# Patient Record
Sex: MALE | Race: WHITE
[De-identification: names, ages, dates, MRNs, and addresses within clinical notes are randomized per-mention and may not be internally consistent; named-entity substitution may affect disease eponyms.]

---

## 2017-03-20 ENCOUNTER — HOSPITAL ENCOUNTER (EMERGENCY)
Dept: HOSPITAL 8 - ED | Age: 27
Discharge: HOME | End: 2017-03-20
Payer: MEDICAID

## 2017-03-20 VITALS — WEIGHT: 152.78 LBS | BODY MASS INDEX: 23.98 KG/M2 | HEIGHT: 67 IN

## 2017-03-20 VITALS — DIASTOLIC BLOOD PRESSURE: 86 MMHG | SYSTOLIC BLOOD PRESSURE: 132 MMHG

## 2017-03-20 DIAGNOSIS — B96.89: ICD-10-CM

## 2017-03-20 DIAGNOSIS — J20.9: Primary | ICD-10-CM

## 2017-03-20 PROCEDURE — 99284 EMERGENCY DEPT VISIT MOD MDM: CPT

## 2017-03-20 PROCEDURE — 71020: CPT

## 2017-04-01 ENCOUNTER — HOSPITAL ENCOUNTER (EMERGENCY)
Dept: HOSPITAL 8 - ED | Age: 27
Discharge: HOME | End: 2017-04-01
Payer: MEDICAID

## 2017-04-01 VITALS — WEIGHT: 151.9 LBS | BODY MASS INDEX: 23.84 KG/M2 | HEIGHT: 67 IN

## 2017-04-01 VITALS — SYSTOLIC BLOOD PRESSURE: 130 MMHG | DIASTOLIC BLOOD PRESSURE: 84 MMHG

## 2017-04-01 DIAGNOSIS — J20.9: Primary | ICD-10-CM

## 2017-04-01 DIAGNOSIS — B96.89: ICD-10-CM

## 2017-04-01 PROCEDURE — 87400 INFLUENZA A/B EACH AG IA: CPT

## 2017-04-01 PROCEDURE — 87880 STREP A ASSAY W/OPTIC: CPT

## 2017-04-01 PROCEDURE — 99285 EMERGENCY DEPT VISIT HI MDM: CPT

## 2017-04-01 PROCEDURE — 87081 CULTURE SCREEN ONLY: CPT

## 2017-04-01 PROCEDURE — 71020: CPT

## 2019-04-10 ENCOUNTER — HOSPITAL ENCOUNTER (EMERGENCY)
Dept: HOSPITAL 8 - ED | Age: 29
Discharge: HOME | End: 2019-04-10
Payer: MEDICAID

## 2019-04-10 VITALS — BODY MASS INDEX: 23.14 KG/M2 | HEIGHT: 66 IN | WEIGHT: 143.96 LBS

## 2019-04-10 VITALS — DIASTOLIC BLOOD PRESSURE: 87 MMHG | SYSTOLIC BLOOD PRESSURE: 132 MMHG

## 2019-04-10 DIAGNOSIS — H53.131: Primary | ICD-10-CM

## 2019-04-10 PROCEDURE — 99282 EMERGENCY DEPT VISIT SF MDM: CPT

## 2019-04-15 ENCOUNTER — OFFICE VISIT (OUTPATIENT)
Dept: VASCULAR LAB | Facility: MEDICAL CENTER | Age: 29
End: 2019-04-15
Attending: INTERNAL MEDICINE
Payer: MEDICAID

## 2019-04-15 VITALS
DIASTOLIC BLOOD PRESSURE: 77 MMHG | SYSTOLIC BLOOD PRESSURE: 122 MMHG | HEIGHT: 67 IN | HEART RATE: 78 BPM | BODY MASS INDEX: 22.6 KG/M2 | WEIGHT: 144 LBS

## 2019-04-15 DIAGNOSIS — H34.8110 CENTRAL RETINAL VEIN OCCLUSION WITH MACULAR EDEMA OF RIGHT EYE: ICD-10-CM

## 2019-04-15 PROCEDURE — 99211 OFF/OP EST MAY X REQ PHY/QHP: CPT

## 2019-04-15 PROCEDURE — 99203 OFFICE O/P NEW LOW 30 MIN: CPT | Performed by: INTERNAL MEDICINE

## 2019-04-15 ASSESSMENT — ENCOUNTER SYMPTOMS
NERVOUS/ANXIOUS: 0
SENSORY CHANGE: 0
HEADACHES: 0
DEPRESSION: 0
EYE PAIN: 0
LOSS OF CONSCIOUSNESS: 0
BLURRED VISION: 1
PALPITATIONS: 0
SPEECH CHANGE: 0
DIZZINESS: 0
SHORTNESS OF BREATH: 0
GASTROINTESTINAL NEGATIVE: 1
DOUBLE VISION: 0
COUGH: 0
WEIGHT LOSS: 0

## 2019-04-15 NOTE — PROGRESS NOTES
INITIAL VASCULAR VISIT  Subjective:   Rob Castillo is a 28 y.o. male who presents today 4/15/2019 for   Chief Complaint   Patient presents with   • Follow-Up     HPI:  Patient here for establishment of care to rule out carotid vascular risk factors as a cause of central retinal vein occlusion.  Patient presented to [facility] on April 10 after having 5 days of painless visual loss in his right eye.  He described this as moderate.  He does wear glasses.  He was diagnosed with central retinal vein occlusion.  He was sent to a retinal specialist who confirmed the diagnosis.  He has been started on Avastin.  He is not currently on antiplatelet or anticoagulant therapy.  He has never had a clotting disorder in the past.  His blood pressure was not high presentation, but he has been monitoring his blood pressure since that time.  All of his readings have been less than 130/80 both in the office and at home.  He has no history of diabetes.  He has been doing fingersticks at home and all of his fasting fingersticks have been less than 100.  No bleeding history.  He was given a prescription for hypercoagulable workup by his retina specialist, but he is not done that yet.  No headache or other neurologic symptoms.  No known history of any other eye problems besides myopia    Past medical history -myopia    Past surgical history -none    Family history -does have family history of hypertension and diabetes but no stroke    Social history -no smoking.  Vigorously denies any stimulants.  No alcohol.  No illicit drug use.  Eats relatively healthy.  Exercises regularly.  He is a student    Medications-Avastin    Allergies no known drug allergies     Review of Systems   Constitutional: Negative for malaise/fatigue and weight loss.   HENT: Negative for hearing loss and tinnitus.    Eyes: Positive for blurred vision. Negative for double vision and pain.   Respiratory: Negative for cough and shortness of breath.   "  Cardiovascular: Negative for chest pain, palpitations and leg swelling.   Gastrointestinal: Negative.    Genitourinary: Negative.    Skin: Negative for itching and rash.   Neurological: Negative for dizziness, sensory change, speech change, loss of consciousness and headaches.   Psychiatric/Behavioral: Negative for depression. The patient is not nervous/anxious.       Objective:     Vitals:    04/15/19 1053   BP: 122/77   BP Location: Left arm   Patient Position: Sitting   BP Cuff Size: Small adult   Pulse: 78   Weight: 65.3 kg (144 lb)   Height: 1.702 m (5' 7\")      Body mass index is 22.55 kg/m².  Physical Exam   Constitutional: He is oriented to person, place, and time. He appears well-developed and well-nourished. No distress.   HENT:   Head: Normocephalic and atraumatic.   Eyes: Pupils are equal, round, and reactive to light. Conjunctivae and EOM are normal.   Neck: Normal range of motion. Neck supple. No thyromegaly present.   Cardiovascular: Normal rate, regular rhythm, normal heart sounds and intact distal pulses.  Exam reveals no friction rub.    No murmur heard.  Pulmonary/Chest: Effort normal and breath sounds normal. No respiratory distress. He has no wheezes. He has no rales.   Abdominal: He exhibits no distension. There is no tenderness. There is no rebound.   Musculoskeletal: Normal range of motion. He exhibits no edema.   Neurological: He is alert and oriented to person, place, and time. No cranial nerve deficit. Coordination normal.   Skin: Skin is warm and dry. He is not diaphoretic.   Psychiatric: He has a normal mood and affect. His behavior is normal.              Data review -records from [facility] emergency room and Dr. Bradley office reviewed          Medical Decision Making:  Today's Assessment / Status / Plan:     1. Central retinal vein occlusion with macular edema of right eye  CBC WITHOUT DIFFERENTIAL    TSH    Comp Metabolic Panel    HEMOGLOBIN A1C    URINALYSIS "     ASSESSMENT  -Central retinal vein occlusion without evidence of high blood pressure or diabetes.  Currently undergoing workup for potential hypercoagulable states.  Under treatment of ophthalmology    PLAN:  -Agree with hypercoagulable workup as ordered by ophthalmology  -Add CBC, TSH, CMP, A1c, urinalysis  -We will defer further management to his retinal specialist  -Can consider addition of aspirin in the future depending upon the results of this workup.  Return to avoid aspirin in the acute setting to avoid intraocular bleeding  -Continue to follow blood pressures at home and let me know if they elevate  -Needs to establish a PCP    We will partner with other providers in the management of established vascular disease and cardiometabolic risk factors.    Studies to Be Obtained: Per ophthalmology  Labs to Be Obtained: As above    Follow up in: 2 months    Michael J Bloch, M.D.     Dr. CHICO Bradley

## 2019-04-18 ENCOUNTER — TELEPHONE (OUTPATIENT)
Dept: VASCULAR LAB | Facility: MEDICAL CENTER | Age: 29
End: 2019-04-18

## 2019-04-18 DIAGNOSIS — D75.1 POLYCYTHEMIA: ICD-10-CM

## 2019-04-18 DIAGNOSIS — H34.8190 CENTRAL RETINAL VEIN OCCLUSION WITH MACULAR EDEMA, UNSPECIFIED LATERALITY: ICD-10-CM

## 2019-04-19 NOTE — TELEPHONE ENCOUNTER
Blood work from [facility] reviewed    Still awaiting results of hypercoagulability and hyper viscosity workup ordered by Dr. Bradley    Review of blood work I ordered elevated hemoglobin at 18.2 with modestly elevated albumin and protein    Situation discussed with patient and his mother on the phone.  Will refer to hematology for further evaluation    Await results of hyperviscosity workup    Michael Bloch, MD  Vascular Medicine    CC: James Bradley

## 2019-04-25 ENCOUNTER — TELEPHONE (OUTPATIENT)
Dept: HEMATOLOGY ONCOLOGY | Facility: MEDICAL CENTER | Age: 29
End: 2019-04-25

## 2019-04-25 NOTE — TELEPHONE ENCOUNTER
1st attempt to contact the patient.  LM on voicemail for patient requesting a call back to schedule a new patient hematology appointment.  NP/Adrianne/Kathy/Michael Bloch-[facility] WITH [facility]

## 2019-04-26 ENCOUNTER — OFFICE VISIT (OUTPATIENT)
Dept: HEMATOLOGY ONCOLOGY | Facility: MEDICAL CENTER | Age: 29
End: 2019-04-26
Payer: MEDICAID

## 2019-04-26 VITALS
HEIGHT: 67 IN | DIASTOLIC BLOOD PRESSURE: 80 MMHG | WEIGHT: 145.28 LBS | TEMPERATURE: 97.9 F | BODY MASS INDEX: 22.8 KG/M2 | SYSTOLIC BLOOD PRESSURE: 110 MMHG | OXYGEN SATURATION: 100 % | RESPIRATION RATE: 16 BRPM | HEART RATE: 76 BPM

## 2019-04-26 DIAGNOSIS — H34.8110 CENTRAL RETINAL VEIN OCCLUSION WITH MACULAR EDEMA OF RIGHT EYE: ICD-10-CM

## 2019-04-26 PROCEDURE — 99203 OFFICE O/P NEW LOW 30 MIN: CPT | Performed by: INTERNAL MEDICINE

## 2019-04-26 ASSESSMENT — PAIN SCALES - GENERAL: PAINLEVEL: NO PAIN

## 2019-04-26 NOTE — PROGRESS NOTES
"Consult Note: Hematology    Date of consultation: 4/26/2019 9:00AM    Referring provider: Bloch, Michael J, M.D.    Reason for consultation: Central retinal vein occlusion with macular edema, possibly secondary to hyperviscosity syndrome.       History of presenting illness:   Diana Castillo is a 27 y/o male with significant past medical history of miosis and central retinal vein occlusion with macular edema who presents today for consultation of hyperviscosity syndrome.     Patient states that he has a long history of miosis, which he sees an optometrist for. Patient states that this problem began back in November when he describes some vision problems with the right eye, like a \"floater\". He states at that time there was no pain or symptoms and that he was able to get by with using anti-glare glasses. On April 3rd he felt what he described as a \"blood vessel popping\" in his right eye. He did not think much of it at the time and his only symptom was increased pressure at the right eye. He was eventually seen by      He was seen at [facility] ED on 4/10/19 after his initial eye symptoms did not resolve. Santos-Pen did not suggest intraocular hypertension. Bedside ultrasound was performed as well and did not show evidence of lens dislocation or retinal detachment.      He was then seen by the optometrist Dr. Bradley same day, 4/10/19, per ED note. An ONH and RNFL OU analysis was completed and a ganglion cell OU analysis as well. Please see scanned documents. At that time he was given the current diagnosis is central retinal vein occlusion with macular edema.     He was then seen again on 4/11/19 at [facility] and had a hyperviscosity evaluation completed as well as multiple labs ordered.  At that time the diagnosis was confirmed and he received an injection of Avastin. He states that he tolerated the injection well, but has not noticed much improvement in his vision. He states that it has not " gotten worse, however.      Patient states that he has been feeling well overall. He states that he still has vision problems in the right eye. He describes it as having a distorted visual field and it is difficult to make out complete picture in his right eye. He states that the vision in his left eye is well and he does not have any active complaints there. He is currently wearing sunglasses in the office and states that he does this to help offer additional protection to his eyes and that it helps with his overall vision. He states that he has not experienced any other symptoms or complaints since the initial onset of this problem.         Medications:    No current outpatient prescriptions on file.    No current facility-administered medications for this visit.       Social History:     Social History    Social History  • Marital status:  Single  Spouse name:  N/A  • Number of children:  N/A  • Years of education:  N/A    Occupational History  • Not on file.    Social History Main Topics  • Smoking status:  Not on file  • Smokeless tobacco:  Not on file  • Alcohol use  Not on file  • Drug use:  Unknown  • Sexual activity:  Not on file    Other Topics Concern  • Not on file    Social History Narrative  • No narrative on file      Family History:   No family history on file.    Review of Systems:  All other review of systems are negative except what was mentioned above in the HPI.    Constitutional: No fever, chills, weight loss ,malaise/fatigue.    HEENT: Floaters in R eye field of vision. No pain in b/l eyes. No new auditory. No sore throat and neck pain.     Respiratory:No new cough, sputum production, shortness of breath and wheezing.    Cardiovascular: No new chest pain, palpitations, orthopnea and leg swelling.    Gastrointestinal: No heartburn, nausea, vomiting ,abdominal pain, hematochezia or melena     Genitourinary: Negative for dysuria, hematuria    Musculoskeletal: No new arthralgias or myalgias  "  Skin: Negative for rash and itching.    Neurological: Negative for focal weakness or headaches.    Endo/Heme/Allergies: No abnormal bleed/bruise.    Psychiatric/Behavioral: No new depression/anxiety.    Physical Exam:  Vitals:   /80 (BP Location: Right arm, Patient Position: Sitting, BP Cuff Size: Adult)   Pulse 76   Temp 36.6 °C (97.9 °F) (Temporal)   Resp 16   Ht 1.702 m (5' 7\")   Wt 65.9 kg (145 lb 4.5 oz)   SpO2 100%   BMI 22.75 kg/m²     General: Not in acute distress, alert and oriented x 3  HEENT: No pallor, icterus. Oropharynx clear.   Neck: Supple, no palpable masses.  Lymph nodes: No palpable cervical, supraclavicular, axillary or inguinal lymphadenopathy.    CVS: regular rate and rhythm, no rubs or gallops  RESP: Clear to auscultate bilaterally, no wheezing or crackles.   ABD: Soft, non tender, non distended, positive bowel sounds, no palpable organomegaly  EXT: No edema or cyanosis  CNS: Alert and oriented x3, No focal deficits.  Skin- No rash      Labs:   No results for input(s): RBC, HEMOGLOBIN, HEMATOCRIT, PLATELETCT, PROTHROMBTM, APTT, INR, IRON, FERRITIN, TOTIRONBC in the last 72 hours.  No results found for: SODIUM, POTASSIUM, CHLORIDE, CO2, GLUCOSE, BUN, CREATININE, BUNCREATRAT, GLOMRATE                       Assessment and Plan:  1. Central retinal vein occlusion with macular edema -  - status post intra-ocular administration of Avastin with stable symptoms.  Extensive hypercoagulable work-up was negative for factor V Leiden mutation, prothrombin gene mutation, protein C S deficiency and antiphospholipid antibody syndrome.  He did have slight elevation in his homocysteine level of unclear significance.  There is some association between homocystine levels and CRVO.  Instructed him to take vitamin B12 and folic acid will check MTFR mutation even though this may not change the treatment plan.    He does have some elevation in his hemoglobin hematocrit with no other WBC or platelet " abnormalities.  Unlikely to have primary polycythemia however given his unusual presentation with check Santiago 2 mutation.  We will also check ANCA, RA factors to rule out any other possibility of vasculitis.  PRATIMA testing was negative.    Informed him that the role for systemic anticoagulation is controversial with no clear-cut data for benefit with aspirin or other anticoagulants .  Informed him to discuss with his ophthalmologist about any contraindication for aspirin, if not, I will be reasonable to try baby aspirin long-term .    Return to clinic as needed.    He agreed and verbalized his agreement and understanding with the current plan.  I answered all questions and concerns he has at this time.              Thank you for allowing me to participate in his care.    Please note that this dictation was created using voice recognition software. I have made every reasonable attempt to correct obvious errors, but I expect that there are errors of grammar and possibly content that I did not discover before finalizing the note.      SIGNATURES:      CC:  Pcp Pt States None  Bloch, Michael J, M.D.

## 2019-05-08 DIAGNOSIS — H34.8192 RETINAL VEIN OCCLUSION, UNSPECIFIED LATERALITY, UNSPECIFIED RETINAL VEIN: ICD-10-CM

## 2019-06-13 ENCOUNTER — APPOINTMENT (OUTPATIENT)
Dept: VASCULAR LAB | Facility: MEDICAL CENTER | Age: 29
End: 2019-06-13
Attending: INTERNAL MEDICINE
Payer: MEDICAID

## 2019-07-11 ENCOUNTER — OFFICE VISIT (OUTPATIENT)
Dept: VASCULAR LAB | Facility: MEDICAL CENTER | Age: 29
End: 2019-07-11
Attending: INTERNAL MEDICINE
Payer: MEDICAID

## 2019-07-11 VITALS
DIASTOLIC BLOOD PRESSURE: 96 MMHG | SYSTOLIC BLOOD PRESSURE: 139 MMHG | WEIGHT: 147.2 LBS | HEIGHT: 67 IN | HEART RATE: 61 BPM | BODY MASS INDEX: 23.1 KG/M2

## 2019-07-11 DIAGNOSIS — H34.8190 CENTRAL RETINAL VEIN OCCLUSION WITH MACULAR EDEMA, UNSPECIFIED LATERALITY: ICD-10-CM

## 2019-07-11 DIAGNOSIS — R03.0 WHITE COAT SYNDROME WITHOUT DIAGNOSIS OF HYPERTENSION: ICD-10-CM

## 2019-07-11 PROCEDURE — 99212 OFFICE O/P EST SF 10 MIN: CPT

## 2019-07-11 PROCEDURE — 99213 OFFICE O/P EST LOW 20 MIN: CPT | Performed by: INTERNAL MEDICINE

## 2019-07-11 RX ORDER — TIMOLOL MALEATE 5 MG/ML
1 SOLUTION/ DROPS OPHTHALMIC 2 TIMES DAILY
COMMUNITY
End: 2019-10-17

## 2019-07-11 ASSESSMENT — ENCOUNTER SYMPTOMS
BLURRED VISION: 1
COUGH: 0
HEADACHES: 0
SHORTNESS OF BREATH: 0
EYE PAIN: 0
SENSORY CHANGE: 0
LOSS OF CONSCIOUSNESS: 0
SPEECH CHANGE: 0
WEIGHT LOSS: 0
PALPITATIONS: 0
DOUBLE VISION: 0

## 2019-07-11 NOTE — PROGRESS NOTES
"  Follow Up VASCULAR VISIT  Subjective:   Rob Castillo is a 28 y.o. male who presents today 7-11-19   Chief Complaint   Patient presents with   • Follow-Up     HPI:  Here for f/u of central retinal vein occlusion  Vision improving   Sees optho regularly.  Still with some 'bruising' in retina  Saw heme who did not recommend starting anticoag or antiplatelet, but did recommend b12 and folate for high homocysteine levels, which patient has started  Previous home bp monitoring all <130/80  Has had high readings in office though.     Social history -no smoking.  Vigorously denies any stimulants.  No alcohol.  No illicit drug use.  Eats relatively healthy.  Exercises regularly.  He is a student    Review of Systems   Constitutional: Negative for malaise/fatigue and weight loss.   Eyes: Positive for blurred vision. Negative for double vision and pain.   Respiratory: Negative for cough and shortness of breath.    Cardiovascular: Negative for chest pain, palpitations and leg swelling.   Neurological: Negative for sensory change, speech change, loss of consciousness and headaches.      Objective:     Vitals:    07/11/19 1123 07/11/19 1127   BP: 127/88 139/96   BP Location: Left arm Left arm   Patient Position: Sitting Sitting   BP Cuff Size: Small adult Small adult   Pulse: (!) 59 61   Weight: 66.8 kg (147 lb 3.2 oz)    Height: 1.7 m (5' 6.93\")       Body mass index is 23.1 kg/m².  Physical Exam   Constitutional: He is oriented to person, place, and time. No distress.   Eyes: Pupils are equal, round, and reactive to light. Conjunctivae and EOM are normal.   Cardiovascular: Normal rate, regular rhythm and normal heart sounds.  Exam reveals no friction rub.    No murmur heard.  Pulmonary/Chest: Effort normal and breath sounds normal. No respiratory distress. He has no wheezes. He has no rales.   Abdominal: There is no rebound.   Musculoskeletal: He exhibits no edema or tenderness.   Neurological: He is alert and " oriented to person, place, and time. No cranial nerve deficit. Coordination normal.   Skin: He is not diaphoretic.   Psychiatric: He has a normal mood and affect. His behavior is normal.              Data review - multiple blood work results reviewed from [facility].  Scanned under media          Medical Decision Making:  Today's Assessment / Status / Plan:     1. Central retinal vein occlusion with macular edema, unspecified laterality  CBC WITHOUT DIFFERENTIAL   2. White coat syndrome without diagnosis of hypertension  REFERRAL TO 24-HOUR BLOOD PRESSURE MONITORING     ASSESSMENT  -Central retinal vein occlusion  - No evidence of connective tissue issues or hypercoaguable state  - high homocysteine levels of unclear significance  - BP elevated in office, but low at home c/w white coat htn    PLAN:  - defer any further hypercoag w/u, if needed, to heme  - repeat cbc  - agree with b12/folte  - check 24 hour ABPM  -We will defer further management to his retinal specialist  -Can consider addition of aspirin in the future Woud tend to avoid aspirin in this sub-acute setting to avoid intraocular bleeding  -Continue to follow blood pressures at home and let me know if they elevate  -Needs to establish a PCP    We will partner with other providers in the management of established vascular disease and cardiometabolic risk factors.    Studies to Be Obtained: ABPM  Labs to Be Obtained: cbc    Follow up in: 2 months    Michael J Bloch, M.D.     Dr. CHICO Bradley

## 2019-07-16 ENCOUNTER — TELEPHONE (OUTPATIENT)
Dept: VASCULAR LAB | Facility: MEDICAL CENTER | Age: 29
End: 2019-07-16

## 2019-07-16 NOTE — TELEPHONE ENCOUNTER
Called patient to schedule ABPM appointment. LM instructing patient to call back when available to schedule.    Say Dueñas, Med. Ass't  [facility]

## 2019-08-07 ENCOUNTER — TELEPHONE (OUTPATIENT)
Dept: VASCULAR LAB | Facility: MEDICAL CENTER | Age: 29
End: 2019-08-07

## 2019-08-07 NOTE — TELEPHONE ENCOUNTER
Called patient to schedule ABPM appointment. LM instructing patient to call back when available to schedule.    Say Dueñas, Med. Ass't  [facility]

## 2019-09-04 ENCOUNTER — TELEPHONE (OUTPATIENT)
Dept: VASCULAR LAB | Facility: MEDICAL CENTER | Age: 29
End: 2019-09-04

## 2019-09-04 NOTE — TELEPHONE ENCOUNTER
Called patient to schedule ABPM appointment. LM instructing patient to call back when available to schedule.    Say Dueñas, Med. Ass't  [facility]

## 2019-09-26 ENCOUNTER — APPOINTMENT (OUTPATIENT)
Dept: VASCULAR LAB | Facility: MEDICAL CENTER | Age: 29
End: 2019-09-26
Payer: MEDICAID

## 2019-10-17 ENCOUNTER — OFFICE VISIT (OUTPATIENT)
Dept: VASCULAR LAB | Facility: MEDICAL CENTER | Age: 29
End: 2019-10-17
Attending: INTERNAL MEDICINE
Payer: MEDICAID

## 2019-10-17 VITALS
SYSTOLIC BLOOD PRESSURE: 127 MMHG | WEIGHT: 145.5 LBS | BODY MASS INDEX: 22.84 KG/M2 | HEART RATE: 80 BPM | DIASTOLIC BLOOD PRESSURE: 83 MMHG | HEIGHT: 67 IN

## 2019-10-17 DIAGNOSIS — H34.8192 RETINAL VEIN OCCLUSION, UNSPECIFIED LATERALITY, UNSPECIFIED RETINAL VEIN: ICD-10-CM

## 2019-10-17 PROCEDURE — 99212 OFFICE O/P EST SF 10 MIN: CPT

## 2019-10-17 PROCEDURE — 99213 OFFICE O/P EST LOW 20 MIN: CPT | Performed by: INTERNAL MEDICINE

## 2019-10-17 RX ORDER — TIMOLOL MALEATE 6.8 MG/ML
SOLUTION/ DROPS OPHTHALMIC
Refills: 11 | COMMUNITY
Start: 2019-09-23 | End: 2020-09-28

## 2019-10-17 RX ORDER — ASPIRIN 81 MG/1
81 TABLET, CHEWABLE ORAL ONCE
Status: SHIPPED | OUTPATIENT
Start: 2019-10-17 | End: 2019-10-18

## 2019-10-17 RX ORDER — BRIMONIDINE TARTRATE 2 MG/ML
SOLUTION/ DROPS OPHTHALMIC
Refills: 0 | COMMUNITY
Start: 2019-08-26 | End: 2019-10-17

## 2019-10-17 ASSESSMENT — ENCOUNTER SYMPTOMS
BLURRED VISION: 1
HEADACHES: 0
EYE PAIN: 0
COUGH: 0
DOUBLE VISION: 0
SENSORY CHANGE: 0
SPEECH CHANGE: 0
LOSS OF CONSCIOUSNESS: 0
PALPITATIONS: 0
WEIGHT LOSS: 0
SHORTNESS OF BREATH: 0

## 2019-10-17 NOTE — PROGRESS NOTES
"  Follow Up VASCULAR VISIT  Subjective:   Rob Castillo is a 28 y.o. male who presents today 10-17-19   Chief Complaint   Patient presents with   • Follow-Up     HPI:  Here for f/u of central retinal vein occlusion  Vision improving   Sees optho regularly.  Saw heme who did not recommend starting anticoag or antiplatelet, but did recommend b12 and folate for high homocysteine levels, which patient has started  Previous home bp monitoring all <130/80 - hasn't been checking it at home  Has had high readings in office though.   Not currently on blood thinner    Social history -no smoking.  Vigorously denies any stimulants.  No alcohol.  No illicit drug use.  Eats relatively healthy.  Exercises regularly.  He is a student    Review of Systems   Constitutional: Negative for malaise/fatigue and weight loss.   Eyes: Positive for blurred vision. Negative for double vision and pain.   Respiratory: Negative for cough and shortness of breath.    Cardiovascular: Negative for chest pain, palpitations and leg swelling.   Neurological: Negative for sensory change, speech change, loss of consciousness and headaches.      Objective:     Vitals:    10/17/19 1102   BP: 127/83   BP Location: Left arm   Patient Position: Sitting   BP Cuff Size: Adult   Pulse: 80   Weight: 66 kg (145 lb 8 oz)   Height: 1.7 m (5' 6.93\")      Body mass index is 22.84 kg/m².  Physical Exam   Constitutional: He is oriented to person, place, and time. No distress.   Eyes: Pupils are equal, round, and reactive to light. Conjunctivae and EOM are normal.   Cardiovascular: Normal rate, regular rhythm and normal heart sounds. Exam reveals no friction rub.   No murmur heard.  Pulmonary/Chest: Effort normal and breath sounds normal. No respiratory distress. He has no wheezes. He has no rales.   Abdominal: There is no rebound.   Musculoskeletal: He exhibits no edema or tenderness.   Neurological: He is alert and oriented to person, place, and time. No cranial " nerve deficit. Coordination normal.   Skin: He is not diaphoretic.   Psychiatric: He has a normal mood and affect. His behavior is normal.              Data review -     multiple blood work results reviewed from [facility].  Scanned under media    Repeat blood work oct 2019 - hgb 17.5          Medical Decision Making:  Today's Assessment / Status / Plan:     1. Retinal vein occlusion, unspecified laterality, unspecified retinal vein  REFERRAL TO FOLLOW-UP WITH PRIMARY CARE    aspirin (ASA) chewable tab 81 mg     ASSESSMENT  -Central retinal vein occlusion  - No evidence of connective tissue issues or hypercoaguable state  - high homocysteine levels of unclear significance  - mildly elevated hgb without genetic predisposition to PV per hem  - BP elevated in office, but low at home c/w white coat htn    PLAN:  - defer any further hypercoag w/u, if needed, to heme  - agree with b12/folte  - check 24 hour ABPM (ordered again)  - trial of low dose asa if ok with retinal specialists  -Continue to follow blood pressures at home and let me know if they elevate  -Needs to establish a PCP - referral placed  - otherwise will defer further management to his retinal specialist    We will partner with other providers in the management of established vascular disease and cardiometabolic risk factors.    Studies to Be Obtained: ABPM  Labs to Be Obtained: none    Follow up in: 3 months    Michael J Bloch, M.D.     Cc:  Dr. CHICO Mota

## 2019-10-22 ENCOUNTER — NON-PROVIDER VISIT (OUTPATIENT)
Dept: VASCULAR LAB | Facility: MEDICAL CENTER | Age: 29
End: 2019-10-22
Attending: INTERNAL MEDICINE
Payer: MEDICAID

## 2019-10-22 DIAGNOSIS — H34.8110 CENTRAL RETINAL VEIN OCCLUSION WITH MACULAR EDEMA OF RIGHT EYE: ICD-10-CM

## 2019-10-22 PROCEDURE — 93786 AMBL BP MNTR W/SW REC ONLY: CPT

## 2019-10-22 PROCEDURE — 93788 AMBL BP MNTR W/SW A/R: CPT

## 2019-10-22 PROCEDURE — 93790 AMBL BP MNTR W/SW I&R: CPT | Performed by: INTERNAL MEDICINE

## 2019-10-23 ENCOUNTER — TELEPHONE (OUTPATIENT)
Dept: VASCULAR LAB | Facility: MEDICAL CENTER | Age: 29
End: 2019-10-23

## 2019-10-23 NOTE — TELEPHONE ENCOUNTER
Reported results to pt about his average BP being 119/73 and Dr. Bloch will go over further details at next appt.

## 2019-10-23 NOTE — PROGRESS NOTES
Ambulatory blood pressure monitor results reviewed  Full report under media tab  Reasonable data acquisition    Mean daytime: 119/72 indicative of well-controlled out of office blood pressure    Nocturnal dip: Appears blunted    Michael Bloch, MD  Vascular Care

## 2020-01-10 ENCOUNTER — APPOINTMENT (OUTPATIENT)
Dept: VASCULAR LAB | Facility: MEDICAL CENTER | Age: 30
End: 2020-01-10
Payer: MEDICAID

## 2020-01-29 ENCOUNTER — OFFICE VISIT (OUTPATIENT)
Dept: VASCULAR LAB | Facility: MEDICAL CENTER | Age: 30
End: 2020-01-29
Attending: INTERNAL MEDICINE
Payer: MEDICAID

## 2020-01-29 VITALS
WEIGHT: 145.5 LBS | HEART RATE: 88 BPM | DIASTOLIC BLOOD PRESSURE: 77 MMHG | BODY MASS INDEX: 22.84 KG/M2 | HEIGHT: 67 IN | SYSTOLIC BLOOD PRESSURE: 133 MMHG

## 2020-01-29 DIAGNOSIS — H34.8192 RETINAL VEIN OCCLUSION, UNSPECIFIED LATERALITY, UNSPECIFIED RETINAL VEIN: ICD-10-CM

## 2020-01-29 PROCEDURE — 99211 OFF/OP EST MAY X REQ PHY/QHP: CPT

## 2020-01-29 PROCEDURE — 99213 OFFICE O/P EST LOW 20 MIN: CPT | Performed by: INTERNAL MEDICINE

## 2020-01-29 ASSESSMENT — ENCOUNTER SYMPTOMS
WEIGHT LOSS: 0
LOSS OF CONSCIOUSNESS: 0
SHORTNESS OF BREATH: 0
DOUBLE VISION: 0
SENSORY CHANGE: 0
SPEECH CHANGE: 0
HEADACHES: 0
COUGH: 0
EYE PAIN: 0
PALPITATIONS: 0
BLURRED VISION: 1

## 2020-01-29 NOTE — PROGRESS NOTES
"  Follow Up VASCULAR VISIT  Subjective:   Rob Castillo is a 28 y.o. male who presents today 1-29-20  Chief Complaint   Patient presents with   • Follow-Up     HPI:  Here for f/u of central retinal vein occlusion  Vision stable   Sees optho regularly.  No recent f/u with heme  Still on b12 and folate  Did not start asa - did not discuss with optho  No bp meds  Home readings [id number]  Had abpm    Social history -no smoking.  Vigorously denies any stimulants.  No alcohol.  No illicit drug use.  Eats relatively healthy.  Exercises regularly.  He is a student    Review of Systems   Constitutional: Negative for malaise/fatigue and weight loss.   Eyes: Positive for blurred vision. Negative for double vision and pain.   Respiratory: Negative for cough and shortness of breath.    Cardiovascular: Negative for chest pain, palpitations and leg swelling.   Neurological: Negative for sensory change, speech change, loss of consciousness and headaches.      Objective:     Vitals:    01/29/20 1053 01/29/20 1056   BP: 131/69 133/77   BP Location: Left arm Left arm   Patient Position: Sitting Sitting   BP Cuff Size: Small adult Small adult   Pulse: 70 88   Weight: 66 kg (145 lb 8.1 oz)    Height: 1.7 m (5' 6.93\")       Body mass index is 22.84 kg/m².  Physical Exam   Constitutional: He is oriented to person, place, and time. No distress.   Eyes: Pupils are equal, round, and reactive to light. Conjunctivae and EOM are normal.   Cardiovascular: Normal rate, regular rhythm and normal heart sounds. Exam reveals no friction rub.   No murmur heard.  Pulmonary/Chest: Effort normal and breath sounds normal. No respiratory distress. He has no wheezes. He has no rales.   Abdominal: There is no rebound.   Musculoskeletal:         General: No tenderness or edema.   Neurological: He is alert and oriented to person, place, and time. No cranial nerve deficit. Coordination normal.   Skin: He is not diaphoretic.   Psychiatric: He " has a normal mood and affect. His behavior is normal.              Data review -     Repeat blood work oct 2019 - hgb 17.5    AbPM Oct 2019:  Mean daytime: 119/72 indicative of well-controlled out of office blood pressure  Nocturnal dip: Appears blunted          Medical Decision Making:  Today's Assessment / Status / Plan:     1. Retinal vein occlusion, unspecified laterality, unspecified retinal vein  CBC WITHOUT DIFFERENTIAL    Basic Metabolic Panel    HOMOCYSTEINE     ASSESSMENT  -Central retinal vein occlusion  - No evidence of connective tissue issues or hypercoaguable state  - high homocysteine levels of unclear significance  - mildly elevated hgb without genetic predisposition to PV per hem  - BP at home in office and on ABPM c/w prehypertension or mild stage 1 hypertension - given overall risk does not qualify for pharmacological therapy    PLAN:  - defer any further hypercoag w/u, if needed, to heme  - continue with b12/folte  - once again recommended a trial of low dose asa if ok with retinal specialists  -Continue to follow blood pressures at home (about once weekly) and let me know if they elevate  - continue much improved diet and exercise habits  -Needs to establish a PCP - given number to call  - otherwise will defer further management to his retinal specialist    We will partner with other providers in the management of established vascular disease and cardiometabolic risk factors.    Studies to Be Obtained: none  Labs to Be Obtained: as above prior to next visit    Follow up in: 6 months    Michael J Bloch, M.D.     Cc:  Dr. CHICO Rosario

## 2020-06-26 ENCOUNTER — TELEPHONE (OUTPATIENT)
Dept: VASCULAR LAB | Facility: MEDICAL CENTER | Age: 30
End: 2020-06-26

## 2020-07-30 ENCOUNTER — APPOINTMENT (OUTPATIENT)
Dept: VASCULAR LAB | Facility: MEDICAL CENTER | Age: 30
End: 2020-07-30
Payer: MEDICAID

## 2020-09-28 ENCOUNTER — OFFICE VISIT (OUTPATIENT)
Dept: VASCULAR LAB | Facility: MEDICAL CENTER | Age: 30
End: 2020-09-28
Attending: INTERNAL MEDICINE
Payer: MEDICAID

## 2020-09-28 VITALS
WEIGHT: 143.8 LBS | HEART RATE: 99 BPM | SYSTOLIC BLOOD PRESSURE: 129 MMHG | BODY MASS INDEX: 22.57 KG/M2 | HEIGHT: 67 IN | DIASTOLIC BLOOD PRESSURE: 79 MMHG

## 2020-09-28 DIAGNOSIS — H34.8192 RETINAL VEIN OCCLUSION, UNSPECIFIED LATERALITY, UNSPECIFIED RETINAL VEIN: ICD-10-CM

## 2020-09-28 DIAGNOSIS — R05.9 COUGH: ICD-10-CM

## 2020-09-28 DIAGNOSIS — H34.8110 CENTRAL RETINAL VEIN OCCLUSION WITH MACULAR EDEMA OF RIGHT EYE: ICD-10-CM

## 2020-09-28 DIAGNOSIS — R03.0 WHITE COAT SYNDROME WITHOUT DIAGNOSIS OF HYPERTENSION: ICD-10-CM

## 2020-09-28 DIAGNOSIS — D75.1 POLYCYTHEMIA: ICD-10-CM

## 2020-09-28 PROCEDURE — 99211 OFF/OP EST MAY X REQ PHY/QHP: CPT

## 2020-09-28 PROCEDURE — 99213 OFFICE O/P EST LOW 20 MIN: CPT | Performed by: INTERNAL MEDICINE

## 2020-09-28 RX ORDER — LATANOPROST 50 UG/ML
1 SOLUTION/ DROPS OPHTHALMIC NIGHTLY
COMMUNITY

## 2020-09-28 RX ORDER — ASPIRIN 81 MG/1
81 TABLET, CHEWABLE ORAL DAILY
Qty: 100 TAB | Refills: 11 | Status: SHIPPED | OUTPATIENT
Start: 2020-09-28

## 2020-09-28 ASSESSMENT — ENCOUNTER SYMPTOMS
DEPRESSION: 0
BLURRED VISION: 1
NERVOUS/ANXIOUS: 0
SHORTNESS OF BREATH: 0
EYE PAIN: 0
DOUBLE VISION: 0
SENSORY CHANGE: 0
WEIGHT LOSS: 0
HEADACHES: 0
SPEECH CHANGE: 0
COUGH: 1
PALPITATIONS: 0

## 2020-09-28 NOTE — PROGRESS NOTES
"  Follow Up VASCULAR VISIT  Subjective:   Rob Castillo is a 28 y.o. male who presents today 9-28-20  Chief Complaint   Patient presents with   • Follow-Up     HPI:  Here for f/u of central retinal vein occlusion  Vision stable   Sees optho regularly.  No recent f/u with heme  Still on b12 and folate  Now on aspirin  No bp meds  Home readings [id number]  No recent follow-up with him   He has been having a bit of exercise-induced cough.  Thinks he may have asthma.  Has not yet established with a PCP    Social history -no smoking.  Vigorously denies any stimulants.  No alcohol.  No illicit drug use.  Eats relatively healthy.  Exercises regularly.  He is a student    Review of Systems   Constitutional: Negative for malaise/fatigue and weight loss.   Eyes: Positive for blurred vision. Negative for double vision and pain.   Respiratory: Positive for cough. Negative for shortness of breath.    Cardiovascular: Negative for chest pain, palpitations and leg swelling.   Neurological: Negative for sensory change, speech change and headaches.   Psychiatric/Behavioral: Negative for depression. The patient is not nervous/anxious.       Objective:     Vitals:    09/28/20 1549   BP: 129/79   BP Location: Left arm   Patient Position: Sitting   BP Cuff Size: Small adult   Pulse: 99   Weight: 65.2 kg (143 lb 12.8 oz)   Height: 1.7 m (5' 6.93\")      Body mass index is 22.57 kg/m².  Physical Exam   Constitutional: He is oriented to person, place, and time. No distress.   HENT:   Head: Normocephalic and atraumatic.   Eyes: Conjunctivae and EOM are normal. No scleral icterus.   Cardiovascular: Normal rate, regular rhythm and normal heart sounds.   No murmur heard.  Pulmonary/Chest: Effort normal and breath sounds normal. No respiratory distress. He has no wheezes. He has no rales.   Musculoskeletal:         General: No tenderness or edema.   Neurological: He is alert and oriented to person, place, and time. No cranial nerve " deficit. Coordination normal.   Skin: He is not diaphoretic.   Psychiatric: He has a normal mood and affect. His behavior is normal.              Data review -     Repeat blood work oct 2019 - hgb 17.5    AbPM Oct 2019:  Mean daytime: 119/72 indicative of well-controlled out of office blood pressure  Nocturnal dip: Appears blunted    Blood work September 2020-hemoglobin 17.3, glucose 82, homocystine 10, GFR 96          Medical Decision Making:  Today's Assessment / Status / Plan:     1. Retinal vein occlusion, unspecified laterality, unspecified retinal vein  aspirin (ASA) 81 MG Chew Tab chewable tablet   2. White coat syndrome without diagnosis of hypertension     3. Central retinal vein occlusion with macular edema of right eye     4. Polycythemia  REFERRAL TO HEMATOLOGY ONCOLOGY Referral to? [facility]   5. Cough  REFERRAL TO FOLLOW-UP WITH PRIMARY CARE     ASSESSMENT  -Central retinal vein occlusion  - No evidence of connective tissue issues or hypercoaguable state  - high homocysteine levels of unclear significance -improved on B12 and folate  - mildly elevated hgb -persistent -  without genetic predisposition to PV per heme  - BP at home in office and on ABPM c/w prehypertension or mild stage 1 hypertension - given overall risk does not qualify for pharmacological therapy  -Possible exercise-induced bronchospasm    PLAN:  - defer any further hypercoag w/u, if needed, to heme  - continue with b12/folte  -Continue low-dose aspirin  -Continue to follow blood pressures at home (about once weekly) and let me know if they elevate  - continue much improved diet and exercise habits  -Needs to establish a PCP - given number to call and referral sent  -Recommended follow-up with hematology -referral sent    We will partner with other providers in the management of established vascular disease and cardiometabolic risk factors.    Studies to Be Obtained: none  Labs to Be Obtained: None currently    Follow up  in: 6 months    Michael J Bloch, M.D.     Cc:  Dr. CHICO Spear

## 2021-03-29 ENCOUNTER — APPOINTMENT (OUTPATIENT)
Dept: VASCULAR LAB | Facility: MEDICAL CENTER | Age: 31
End: 2021-03-29
Payer: MEDICAID

## 2021-05-20 ENCOUNTER — OFFICE VISIT (OUTPATIENT)
Dept: VASCULAR LAB | Facility: MEDICAL CENTER | Age: 31
End: 2021-05-20
Attending: INTERNAL MEDICINE
Payer: MEDICAID

## 2021-05-20 VITALS
SYSTOLIC BLOOD PRESSURE: 118 MMHG | HEART RATE: 76 BPM | WEIGHT: 144 LBS | DIASTOLIC BLOOD PRESSURE: 72 MMHG | BODY MASS INDEX: 23.14 KG/M2 | HEIGHT: 66 IN

## 2021-05-20 DIAGNOSIS — H34.8192 RETINAL VEIN OCCLUSION, UNSPECIFIED LATERALITY, UNSPECIFIED RETINAL VEIN: ICD-10-CM

## 2021-05-20 PROCEDURE — 99213 OFFICE O/P EST LOW 20 MIN: CPT | Performed by: INTERNAL MEDICINE

## 2021-05-20 PROCEDURE — 99212 OFFICE O/P EST SF 10 MIN: CPT

## 2021-05-20 NOTE — PROGRESS NOTES
"  Follow Up VASCULAR VISIT  Subjective:   Rob Castillo is a 28 y.o. male who presents today 5-20-21  Chief Complaint   Patient presents with   • Follow-Up     HPI:  Here for f/u of central retinal vein occlusion  No further vision changes  Sees optho regularly.  Saw hematology who asked for some more blood work  Still on b12 and folate  Remains on aspirin  No bp meds  Home readings [id number]      Social history -no smoking.  Vigorously denies any stimulants.  No alcohol.  No illicit drug use.  Eats relatively healthy.  Exercises regularly.  He is a student     Objective:     Vitals:    05/20/21 1006   BP: 118/72   BP Location: Left arm   Patient Position: Sitting   BP Cuff Size: Adult   Pulse: 76   Weight: 65.3 kg (144 lb)   Height: 1.676 m (5' 6\")      Body mass index is 23.24 kg/m².  Physical Exam  Constitutional:       General: He is not in acute distress.     Appearance: He is not diaphoretic.   HENT:      Head: Normocephalic and atraumatic.   Eyes:      General: No scleral icterus.     Conjunctiva/sclera: Conjunctivae normal.   Cardiovascular:      Rate and Rhythm: Normal rate and regular rhythm.      Heart sounds: Normal heart sounds. No murmur heard.     Pulmonary:      Effort: Pulmonary effort is normal. No respiratory distress.      Breath sounds: Normal breath sounds. No wheezing or rales.   Musculoskeletal:         General: No tenderness.   Neurological:      Mental Status: He is alert and oriented to person, place, and time.      Cranial Nerves: No cranial nerve deficit.      Coordination: Coordination normal.   Psychiatric:         Behavior: Behavior normal.                Data review -     Repeat blood work oct 2019 - hgb 17.5    AbPM Oct 2019:  Mean daytime: 119/72 indicative of well-controlled out of office blood pressure  Nocturnal dip: Appears blunted    Blood work September 2020-hemoglobin 17.3, glucose 82, homocystine 10, GFR 96          Medical Decision Making:  Today's Assessment / " Status / Plan:     1. Retinal vein occlusion, unspecified laterality, unspecified retinal vein       ASSESSMENT  - Central retinal vein occlusion  - No evidence of connective tissue issues or hypercoaguable state  - high homocysteine levels of unclear significance -improved on B12 and folate  - mildly elevated hgb -persistent -  without genetic predisposition to PV per heme  - BP at home in office and on ABPM c/w prehypertension or mild stage 1 hypertension in the past.  Now appears normotensive with improvement in diet and exercise habits  -Possible exercise-induced bronchospasm    PLAN:  -Obtain Santiago mutation evaluation as heme ordered  -We will also obtain a repeat CBC, B12, folate, homocystine, and MTHFR mutation analysis as requested in hematology note  - continue with b12/folte  -Continue low-dose aspirin  -Continue to follow blood pressures at home (about once weekly) and let me know if they elevate  - continue much improved diet and exercise habits  -Follow-up regularly with ophthalmology  -Report any vision changes to ophthalmology immediately    We will partner with other providers in the management of established vascular disease and cardiometabolic risk factors.    Studies to Be Obtained: none  Labs to Be Obtained: None currently    Follow up in: 3 months to review blood work    Michael J Bloch, M.D.     Cc:  Dr. CHICO Lovelace

## 2021-08-25 ENCOUNTER — OFFICE VISIT (OUTPATIENT)
Dept: VASCULAR LAB | Facility: MEDICAL CENTER | Age: 31
End: 2021-08-25
Attending: INTERNAL MEDICINE
Payer: MEDICAID

## 2021-08-25 DIAGNOSIS — D75.1 POLYCYTHEMIA: ICD-10-CM

## 2021-08-25 DIAGNOSIS — H34.8110 CENTRAL RETINAL VEIN OCCLUSION WITH MACULAR EDEMA OF RIGHT EYE: ICD-10-CM

## 2021-08-25 PROCEDURE — 99213 OFFICE O/P EST LOW 20 MIN: CPT | Performed by: INTERNAL MEDICINE

## 2021-08-25 NOTE — PROGRESS NOTES
Follow Up VASCULAR VISIT  Subjective:   Rob Castillo is a 31 y.o. male who presents today 8-26-21 for vascular followup     This visit was conducted via Zoom video call using secure and encrypted video conferencing technology due to covid restrictions.   The patient was in a private location in the state [facility]   The patient's identity was confirmed and verbal consent was obtained for this virtual visit.    HPI:  Here for f/u of central retinal vein occlusion  No further vision changes  Sees optho regularly.  Hasn't had blood work done - having trouble getting the order for reina mutation that was requested by [facility]  Still on b12 and folate  Remains on aspirin  No bp meds  Home readings [id number]    Social history -no smoking.  Vigorously denies any stimulants.  No alcohol.  No illicit drug use.  Eats relatively healthy.  Exercises regularly.  He is a student     Objective:     There were no vitals filed for this visit.   There is no height or weight on file to calculate BMI.  Physical Exam  Constitutional:       General: He is not in acute distress.     Appearance: He is not diaphoretic.   HENT:      Head: Normocephalic and atraumatic.   Eyes:      General: No scleral icterus.     Conjunctiva/sclera: Conjunctivae normal.   Pulmonary:      Effort: Pulmonary effort is normal. No respiratory distress.   Musculoskeletal:         General: No tenderness.   Skin:     Coloration: Skin is not pale.   Neurological:      General: No focal deficit present.      Mental Status: He is alert and oriented to person, place, and time.      Cranial Nerves: No cranial nerve deficit.      Coordination: Coordination normal.   Psychiatric:         Mood and Affect: Mood normal.         Behavior: Behavior normal.                Data review -     Repeat blood work oct 2019 - hgb 17.5    AbPM Oct 2019:  Mean daytime: 119/72 indicative of well-controlled out of office blood pressure  Nocturnal dip: Appears blunted    Blood work  September 2020-hemoglobin 17.3, glucose 82, homocystine 10, GFR 96          Medical Decision Making:  Today's Assessment / Status / Plan:     1. Polycythemia  JAK2 GENE MUT RFLX CALR RFLX MPL   2. Central retinal vein occlusion with macular edema of right eye       ASSESSMENT  - Central retinal vein occlusion, stable  - No evidence of connective tissue issues or hypercoaguable state  - high homocysteine levels of unclear significance -improved on B12 and folate  - mildly elevated hgb -persistent -  without genetic predisposition to PV per heme  - BP at home in office and on ABPM c/w prehypertension or mild stage 1 hypertension in the past.  Now appears normotensive with improvement in diet and exercise habits    PLAN:  -Obtain Santiago mutation evaluation as heme ordered - new lab slip generated  -still awaiting repeat CBC, B12, folate, homocystine, and MTHFR mutation analysis   - continue with b12/folte  -Continue low-dose aspirin  -Continue to follow blood pressures at home (about once weekly) and let me know if they elevate  - continue much improved diet and exercise habits  -Follow-up regularly with ophthalmology  -Report any vision changes to ophthalmology immediately    We will partner with other providers in the management of established vascular disease and cardiometabolic risk factors.    Studies to Be Obtained: none  Labs to Be Obtained: SANTIAGO mutation, mthfr dna analysis, homocysteine, folate, b12, cbc    Follow up in: 2 months to review blood work and bp - virtual visit by patient request    Michael J Bloch, M.D.     Cc:  Dr. CHICO Lovelace

## 2021-10-20 ENCOUNTER — OFFICE VISIT (OUTPATIENT)
Dept: VASCULAR LAB | Facility: MEDICAL CENTER | Age: 31
End: 2021-10-20
Attending: INTERNAL MEDICINE
Payer: MEDICAID

## 2021-10-20 ENCOUNTER — TELEPHONE (OUTPATIENT)
Dept: VASCULAR LAB | Facility: MEDICAL CENTER | Age: 31
End: 2021-10-20

## 2021-10-20 DIAGNOSIS — H34.8110 CENTRAL RETINAL VEIN OCCLUSION WITH MACULAR EDEMA OF RIGHT EYE: ICD-10-CM

## 2021-10-20 DIAGNOSIS — D75.1 POLYCYTHEMIA: ICD-10-CM

## 2021-10-20 PROCEDURE — 99213 OFFICE O/P EST LOW 20 MIN: CPT | Performed by: INTERNAL MEDICINE

## 2021-10-20 NOTE — PROGRESS NOTES
Follow Up VASCULAR VISIT  Subjective:   Rob Castillo is a 31 y.o. male who presents today 10-20-21 for vascular followup     This visit was conducted via Zoom video call using secure and encrypted video conferencing technology due to covid restrictions.   The patient was in a private location in the state [facility]   The patient's identity was confirmed and verbal consent was obtained for this virtual visit.    HPI:  Here for f/u of central retinal vein occlusion  No further vision changes  Sees optho regularly.  Has appointment to see them later this week  Had blood work done yesterday, results pending  Still on b12 and folate  Remains on aspirin  No bp meds  Home readings [id number]    Social history -no smoking.  Vigorously denies any stimulants.  No alcohol.  No illicit drug use.  Eats relatively healthy.  Exercises regularly.  He is a student     Objective:     There were no vitals filed for this visit.   There is no height or weight on file to calculate BMI.  Physical Exam  Constitutional:       General: He is not in acute distress.     Appearance: He is not diaphoretic.   HENT:      Head: Normocephalic and atraumatic.   Eyes:      General: No scleral icterus.     Conjunctiva/sclera: Conjunctivae normal.   Pulmonary:      Effort: Pulmonary effort is normal. No respiratory distress.   Musculoskeletal:         General: No tenderness.   Skin:     Coloration: Skin is not pale.   Neurological:      General: No focal deficit present.      Mental Status: He is alert and oriented to person, place, and time.      Cranial Nerves: No cranial nerve deficit.      Coordination: Coordination normal.   Psychiatric:         Mood and Affect: Mood normal.         Behavior: Behavior normal.                Data review -     Repeat blood work oct 2019 - hgb 17.5    AbPM Oct 2019:  Mean daytime: 119/72 indicative of well-controlled out of office blood pressure  Nocturnal dip: Appears blunted    Blood work September  2020-hemoglobin 17.3, glucose 82, homocystine 10, GFR 96          Medical Decision Making:  Today's Assessment / Status / Plan:     1. Polycythemia     2. Central retinal vein occlusion with macular edema of right eye       ASSESSMENT    - Central retinal vein occlusion, stable  - No evidence of connective tissue issues or hypercoaguable state  - high homocysteine levels of unclear significance -improved on B12 and folate  - mildly elevated hgb -persistent -  without genetic predisposition to PV per heme  - BP at home in office and on ABPM c/w prehypertension or mild stage 1 hypertension in the past.  Now appears normotensive with improvement in diet and exercise habits    PLAN:    -Obtain Santiago mutation evaluation as heme ordered - new lab slip generated -our MA called [facility] today and blood work still pending  -still awaiting repeat CBC, B12, folate, homocystine, and MTHFR mutation analysis -blood work is still pending  - continue with b12/folte  -Continue low-dose aspirin  -Continue to follow blood pressures at home (about once weekly) and let me know if they elevate  - continue much improved diet and exercise habits  -Follow-up regularly with ophthalmology.  -Report any vision changes to ophthalmology immediately  -Patient states that his mom had some questions about getting some genetic testing done.  As I told him I think this is outside the realm of expertise.  I encouraged him to follow-up with his retinal specialist regarding any questions about genetic testing    We will partner with other providers in the management of established vascular disease and cardiometabolic risk factors.    Studies to Be Obtained: none  Labs to Be Obtained: SANTIAGO mutation, mthfr dna analysis, homocysteine, folate, b12, cbc -results pending    Follow up in: By phone once blood tests available.    Michael J Bloch, M.D.     Cc:  Dr. CHICO Lovelace

## 2021-10-21 ENCOUNTER — TELEPHONE (OUTPATIENT)
Dept: VASCULAR LAB | Facility: MEDICAL CENTER | Age: 31
End: 2021-10-21

## 2021-10-21 NOTE — TELEPHONE ENCOUNTER
Called and left VM letting pt know the below information.     ----- Message from TAISHA Agarwal sent at 10/21/2021  7:17 AM PDT -----  Regarding: lab work  Please let the pt know that the only test back from the lab so far is the CBC. It is stable and we will scan it into his chart. When the remainder comes back we can let him know. Thanks TAISHA Agarwal

## 2021-10-21 NOTE — TELEPHONE ENCOUNTER
Called pt and notified him.  ----- Message from Michael J Bloch, M.D. sent at 10/20/2021  4:01 PM PDT -----  Regarding: call when blood work available  Pls call patient and let him know that his blood work is still pending.  We will call him when we have results.  Otherwise he should follow up with his retinal specialist.    lito

## 2023-12-13 ENCOUNTER — OFFICE VISIT (OUTPATIENT)
Dept: VASCULAR LAB | Facility: MEDICAL CENTER | Age: 33
End: 2023-12-13
Attending: INTERNAL MEDICINE
Payer: MEDICAID

## 2023-12-13 VITALS
HEART RATE: 101 BPM | DIASTOLIC BLOOD PRESSURE: 81 MMHG | HEIGHT: 66 IN | SYSTOLIC BLOOD PRESSURE: 127 MMHG | WEIGHT: 158 LBS | BODY MASS INDEX: 25.39 KG/M2

## 2023-12-13 DIAGNOSIS — H34.8110 CENTRAL RETINAL VEIN OCCLUSION WITH MACULAR EDEMA OF RIGHT EYE: ICD-10-CM

## 2023-12-13 PROCEDURE — 99212 OFFICE O/P EST SF 10 MIN: CPT

## 2023-12-13 PROCEDURE — 3074F SYST BP LT 130 MM HG: CPT | Performed by: INTERNAL MEDICINE

## 2023-12-13 PROCEDURE — 3079F DIAST BP 80-89 MM HG: CPT | Performed by: INTERNAL MEDICINE

## 2023-12-13 PROCEDURE — 99215 OFFICE O/P EST HI 40 MIN: CPT | Performed by: INTERNAL MEDICINE

## 2023-12-13 RX ORDER — DORZOLAMIDE HCL 20 MG/ML
SOLUTION/ DROPS OPHTHALMIC
COMMUNITY
Start: 2023-12-06

## 2023-12-14 ENCOUNTER — DOCUMENTATION (OUTPATIENT)
Dept: VASCULAR LAB | Facility: MEDICAL CENTER | Age: 33
End: 2023-12-14
Payer: MEDICAID

## 2023-12-14 NOTE — PROGRESS NOTES
Called and left message for patient requesting which [facility] lab patient would like get labs done, so we can fax accordingly.    Faxed request for most recent visit notes from [facility] for Dr Bloch.  (Fx#[phone])    Fx confirmation in media.    Rubi Collier, Med Ass't  [facility]  Ph. [phone]  Fx. [phone]

## 2023-12-14 NOTE — PROGRESS NOTES
"  Follow Up VASCULAR VISIT  Subjective:   Rob Castillo is a 31 y.o. male who presents 12/13/23   for vascular followup     HPI:  Here for f/u of central retinal vein occlusion  Apparently about 2 weeks ago patient had worsening vision in his right eye.  He went back to see his ophthalmologist and then to his retinal specialist and according to patient and his mother he was again diagnosed with a recurrent central retinal vein occlusion.  He has been taking drops and getting injections with no real improvement in his vision  He is seeing the retina specialist regularly  He had stopped taking his aspirin and has not restarted  He takes B12 but not folate as previously prescribed  His blood pressure has been high at the ophthalmologist office but he has not been checking it at home  He is on no blood pressure medicines    Social history -no smoking.  Vigorously denies any stimulants.  No alcohol.  No illicit drug use.  Eats relatively healthy.  Exercises regularly.  He is a student     Objective:     Vitals:    12/13/23 1545   BP: 127/81   BP Location: Left arm   Patient Position: Sitting   BP Cuff Size: Adult   Pulse: (!) 101   Weight: 71.7 kg (158 lb)   Height: 1.676 m (5' 6\")      Body mass index is 25.5 kg/m².  Physical Exam  Constitutional:       General: He is not in acute distress.     Appearance: He is not diaphoretic.   HENT:      Head: Normocephalic and atraumatic.   Eyes:      General: No scleral icterus.     Conjunctiva/sclera: Conjunctivae normal.   Pulmonary:      Effort: Pulmonary effort is normal. No respiratory distress.   Musculoskeletal:         General: No tenderness.   Skin:     Coloration: Skin is not pale.   Neurological:      General: No focal deficit present.      Mental Status: He is alert and oriented to person, place, and time.      Cranial Nerves: No cranial nerve deficit.      Coordination: Coordination normal.   Psychiatric:         Mood and Affect: Mood normal.         Behavior: " Behavior normal.                Data review -     Blood work 2019  ANCA negative  Rheumatoid factor negative  Factor V Leiden negative    Repeat blood work oct 2019 - hgb 17.5    AbPM Oct 2019:  Mean daytime: 119/72 indicative of well-controlled out of office blood pressure  Nocturnal dip: Appears blunted    Blood work September 2020-hemoglobin 17.3, glucose 82, homocystine 10, GFR 96    Blood work [facility] October 2021  White count 4.2, hemoglobin 16.8, platelet count 214,  Heterozygous for MTHFR variant  Santiago 2 mutation negative    Blood work November 2023  Antithrombin III normal  Antiphospholipid antibodies unremarkable  Lupus anticoagulant negative  Total cholesterol 203, HDL 62, triglycerides 61,   Homocystine 13.8  A1c 5.0  Lysozyme 6.3  Protein C normal  Protein S normal  ACE enzyme normal  PRATIMA normal          Medical Decision Making:  Today's Assessment / Status / Plan:     1. Central retinal vein occlusion with macular edema of right eye          ASSESSMENT    - Central retinal vein occlusion, recurrent and unexplained  - No evidence of connective tissue issues or hypercoaguable state  - high homocysteine levels of unclear significance -but currently untreated  - mildly elevated hgb -persistent -  without genetic predisposition to PV per heme  - BP at home in office and on ABPM c/w prehypertension or mild stage 1 hypertension in the past.  Mildly elevated in the office today.  Unclear at home    PLAN:    As I discussed with patient and his mother, aside from ruling out hypercoagulable state there is not much of a role for vascular medicine in the management of central retinal vein occlusion    -Restart home blood pressure monitoring  -Consider antihypertensive therapy, likely ARB, if greater than 130/80 at home  -Consider repeat ABPM  -Restart Folgard -prescription sent to his pharmacy  -Restart aspirin  -Obtain prothrombin gene mutation as I cannot find documentation that was normal in the  past  -Follow-up regularly with ophthalmology and retinal specialist  -Urgent referral to neuro-ophthalmology  -Report any vision changes to ophthalmology immediately  -Follow-up with hematology or rheumatology ophthalmology thinks it would be helpful    We will partner with other providers in the management of established vascular disease and cardiometabolic risk factors.    Studies to Be Obtained: none  Labs to Be Obtained: Prothrombin gene mutation    Follow up in: 4 weeks    Total time: 45 min - chart review/prep, review of other providers' records, imaging/lab review, face-to-face time for history/examination, ordering, prescribing,  review of results/meds/ treatment plan with patient/family/caregiver, documentation in EMR, care coordination (as needed)     Michael J Bloch, M.D.     Cc:  Dr. CHICO Lovelace

## 2023-12-18 ENCOUNTER — TELEPHONE (OUTPATIENT)
Dept: VASCULAR LAB | Facility: MEDICAL CENTER | Age: 33
End: 2023-12-18
Payer: MEDICAID

## 2023-12-18 NOTE — TELEPHONE ENCOUNTER
Caller: Rob Castillo     Topic/issue: Pt called back in regards to rescheduling appt with Bloch, Pt would like his lab work to be sent to [facility]; [address] [place] [address]     Phone: [phone]    Callback Number: [phone] (home)      Thank you,     Randy BHAGAT

## 2023-12-18 NOTE — TELEPHONE ENCOUNTER
Called and left message to reschedule follow-up appt with Dr Bloch on Jan 8 2024.  AND to confirm which lab [facility] to send lab work order to.      Openings on:  Jan 5 - 1PM-440PM     Please contact Rubi Collier, Med Ass't  for further questions.     Rubi Collier, Med Ass't  [facility]  Ph. [phone]  Fx. [phone]

## 2023-12-19 ENCOUNTER — DOCUMENTATION (OUTPATIENT)
Dept: VASCULAR LAB | Facility: MEDICAL CENTER | Age: 33
End: 2023-12-19
Payer: MEDICAID

## 2023-12-19 NOTE — PROGRESS NOTES
Faxed lab orders from Dr Bloch to [facility].  (Fx#[phone])    Fx confirmation in media.    Rubi Collier, Med Ass't  [facility]  Ph. [phone]  Fx. [phone]

## 2024-01-05 ENCOUNTER — OFFICE VISIT (OUTPATIENT)
Dept: VASCULAR LAB | Facility: MEDICAL CENTER | Age: 34
End: 2024-01-05
Attending: INTERNAL MEDICINE
Payer: MEDICAID

## 2024-01-05 VITALS
HEART RATE: 86 BPM | HEIGHT: 66 IN | DIASTOLIC BLOOD PRESSURE: 81 MMHG | BODY MASS INDEX: 25.71 KG/M2 | WEIGHT: 160 LBS | SYSTOLIC BLOOD PRESSURE: 122 MMHG

## 2024-01-05 DIAGNOSIS — H34.8110 CENTRAL RETINAL VEIN OCCLUSION WITH MACULAR EDEMA OF RIGHT EYE: ICD-10-CM

## 2024-01-05 PROCEDURE — 99213 OFFICE O/P EST LOW 20 MIN: CPT | Performed by: INTERNAL MEDICINE

## 2024-01-05 PROCEDURE — 99212 OFFICE O/P EST SF 10 MIN: CPT

## 2024-01-05 PROCEDURE — 3079F DIAST BP 80-89 MM HG: CPT | Performed by: INTERNAL MEDICINE

## 2024-01-05 PROCEDURE — 3074F SYST BP LT 130 MM HG: CPT | Performed by: INTERNAL MEDICINE

## 2024-01-05 NOTE — PROGRESS NOTES
"  Follow Up VASCULAR VISIT  Subjective:   Rob Castillo is a 33 y.o. male who presents 01/05/24   for vascular followup     HPI:  Here for f/u of central retinal vein occlusion  Has regular follow-up with retina specialist  Vision unchanged  He is pursuing a second opinion at [place]  Has not yet seen neuro-ophthalmology  Starting Folgard but has not yet started aspirin  No blood pressure medications  Blood pressures at home are less than 130/80      Social history -no smoking.  Vigorously denies any stimulants.  No alcohol.  No illicit drug use.  Eats relatively healthy.  Exercises regularly.  He is a student     Objective:     Vitals:    01/05/24 1349   BP: 122/81   BP Location: Left arm   Patient Position: Sitting   BP Cuff Size: Adult   Pulse: 86   Weight: 72.6 kg (160 lb)   Height: 1.676 m (5' 6\")      Body mass index is 25.82 kg/m².  Physical Exam  Vitals reviewed.   Constitutional:       General: He is not in acute distress.     Appearance: He is not toxic-appearing or diaphoretic.   HENT:      Head: Normocephalic and atraumatic.   Eyes:      General: No scleral icterus.     Conjunctiva/sclera: Conjunctivae normal.   Neck:      Vascular: No carotid bruit.   Cardiovascular:      Rate and Rhythm: Normal rate and regular rhythm.      Heart sounds: Normal heart sounds. No murmur heard.     No friction rub. No gallop.   Pulmonary:      Effort: Pulmonary effort is normal. No respiratory distress.      Breath sounds: Normal breath sounds. No wheezing or rales.   Musculoskeletal:         General: No tenderness.      Right lower leg: No edema.      Left lower leg: No edema.   Skin:     Coloration: Skin is not pale.   Neurological:      General: No focal deficit present.      Mental Status: He is alert and oriented to person, place, and time.      Cranial Nerves: No cranial nerve deficit.      Coordination: Coordination normal.      Gait: Gait normal.   Psychiatric:         Mood and Affect: Mood normal.         " Behavior: Behavior normal.                Data review -     Blood work 2019  ANCA negative  Rheumatoid factor negative  Factor V Leiden negative    Repeat blood work oct 2019 - hgb 17.5    AbPM Oct 2019:  Mean daytime: 119/72 indicative of well-controlled out of office blood pressure  Nocturnal dip: Appears blunted    Blood work September 2020-hemoglobin 17.3, glucose 82, homocystine 10, GFR 96    Blood work [facility] October 2021  White count 4.2, hemoglobin 16.8, platelet count 214,  Heterozygous for MTHFR variant  Santiago 2 mutation negative    Blood work November 2023  Antithrombin III normal  Antiphospholipid antibodies unremarkable  Lupus anticoagulant negative  Total cholesterol 203, HDL 62, triglycerides 61,   Homocystine 13.8  A1c 5.0  Lysozyme 6.3  Protein C normal  Protein S normal  ACE enzyme normal  PRATIMA normal    Blood work from December 2023  WBC 3.6, hemoglobin 17.8, platelet count 201  ANCA-unremarkable          Medical Decision Making:  Today's Assessment / Status / Plan:     1. Central retinal vein occlusion with macular edema of right eye          ASSESSMENT    - Central retinal vein occlusion, recurrent and unexplained  - No evidence of connective tissue issues or hypercoaguable state  - high homocysteine levels of unclear significance -recently started back on treatment  - mildly elevated hgb -persistent -  without genetic predisposition to PV per heme  - BP at home in office and on ABPM c/w prehypertension or mild stage 1 hypertension in the past.  Not consistently elevated at home or in the office currently    PLAN:    As I discussed with patient, aside from ruling out hypercoagulable state there is not much of a role for vascular medicine in the management of central retinal vein occlusion    -Restart home blood pressure monitoring  -Consider antihypertensive therapy, likely ARB, if greater than 130/80 at home  -Consider repeat ABPM  -Continue Folgard -prescription sent to his  pharmacy  -Restart aspirin as previously recommended  -Obtain prothrombin gene mutation as I cannot find documentation that was normal in the past  -Repeat homocystine levels  -Repeat CBC  -Repeat lipid panel  -Follow-up regularly with ophthalmology and retinal specialist  -Urgent referral to neuro-ophthalmology previously placed  -Report any vision changes to ophthalmology immediately  -Follow-up with hematology or rheumatology ophthalmology thinks it would be helpful  -Agree with pursuing [facility] referral as recommended by PCP    We will partner with other providers in the management of established vascular disease and cardiometabolic risk factors.    Studies to Be Obtained: none  Labs to Be Obtained: Prothrombin gene mutation, lipid panel, CBC, homocystine    Follow up in: 8 weeks    Total time: 45 min - chart review/prep, review of other providers' records, imaging/lab review, face-to-face time for history/examination, ordering, prescribing,  review of results/meds/ treatment plan with patient/family/caregiver, documentation in EMR, care coordination (as needed)     Michael J Bloch, M.D.     Cc:  Dr. CHICO Lovelace

## 2024-01-25 DIAGNOSIS — R79.83 HYPERHOMOCYSTINEMIA: ICD-10-CM

## 2024-01-25 DIAGNOSIS — H34.8110 CENTRAL RETINAL VEIN OCCLUSION WITH MACULAR EDEMA OF RIGHT EYE: ICD-10-CM

## 2024-02-12 DIAGNOSIS — H34.8110 CENTRAL RETINAL VEIN OCCLUSION WITH MACULAR EDEMA OF RIGHT EYE: ICD-10-CM

## 2024-02-12 DIAGNOSIS — R79.83 HYPERHOMOCYSTINEMIA: ICD-10-CM

## 2024-03-01 ENCOUNTER — TELEPHONE (OUTPATIENT)
Dept: VASCULAR LAB | Facility: MEDICAL CENTER | Age: 34
End: 2024-03-01
Payer: MEDICAID

## 2024-03-01 DIAGNOSIS — H34.8110 CENTRAL RETINAL VEIN OCCLUSION WITH MACULAR EDEMA OF RIGHT EYE: Primary | ICD-10-CM

## 2024-03-01 DIAGNOSIS — R79.83 HYPERHOMOCYSTINEMIA: ICD-10-CM

## 2024-03-02 NOTE — TELEPHONE ENCOUNTER
Established patient  Chart prep for upcoming appointment with Dr. Bloch    Any pending/incomplete orders from last visit? Yes Labs, patient reminded to try to complete prior to appointment. LVM.  Were any records requested?  No  Correct appointment type (New/Established/virtual)? Yes  Referral up to date? Yes renewal ordered and sent to provider to co-sign   Referral attached to appointment (renewals and New patients only)? N/A (established with up-to-date referral)    Rubi Collier, Med Ass't  [facility]  Ph. [phone]  Fx. [phone]

## 2024-03-08 ENCOUNTER — OFFICE VISIT (OUTPATIENT)
Dept: VASCULAR LAB | Facility: MEDICAL CENTER | Age: 34
End: 2024-03-08
Attending: INTERNAL MEDICINE
Payer: MEDICAID

## 2024-03-08 VITALS
SYSTOLIC BLOOD PRESSURE: 128 MMHG | BODY MASS INDEX: 26.2 KG/M2 | HEART RATE: 81 BPM | WEIGHT: 163 LBS | HEIGHT: 66 IN | DIASTOLIC BLOOD PRESSURE: 84 MMHG

## 2024-03-08 DIAGNOSIS — H34.8110 CENTRAL RETINAL VEIN OCCLUSION WITH MACULAR EDEMA OF RIGHT EYE: ICD-10-CM

## 2024-03-08 DIAGNOSIS — R79.83 HYPERHOMOCYSTINEMIA: ICD-10-CM

## 2024-03-08 PROCEDURE — 99212 OFFICE O/P EST SF 10 MIN: CPT

## 2024-03-08 PROCEDURE — 99214 OFFICE O/P EST MOD 30 MIN: CPT | Performed by: INTERNAL MEDICINE

## 2024-03-08 PROCEDURE — 3079F DIAST BP 80-89 MM HG: CPT | Performed by: INTERNAL MEDICINE

## 2024-03-08 PROCEDURE — 3074F SYST BP LT 130 MM HG: CPT | Performed by: INTERNAL MEDICINE

## 2024-03-08 RX ORDER — CYANOCOBALAMIN/FOLIC AC/VIT B6 0.5-2.2-25
1 TABLET ORAL DAILY
Qty: 30 TABLET | Refills: 11
Start: 2024-03-08

## 2024-03-08 NOTE — PROGRESS NOTES
"  Follow Up VASCULAR VISIT  Subjective:   Rob Castillo is a 33 y.o. male who presents 03/08/24   for vascular followup     Subjective      HPI:  Here for f/u of central retinal vein occlusion  Patient went down to [place] for second opinion.  He was admitted.  He had a very extensive workup.  He he took a course of steroids.  He is also getting injections from his retinal specialist.  His vision is improving.  He remains on Folplex and is taking additional supplements as well  He remains on aspirin  He does have a follow-up appointment at [place] to see multiple specialist including rheumatology  He continues to follow-up with his retinal specialist on a regular basis  He has not been measuring his blood pressure at home  He is on no blood pressure medications    Social history -no smoking.  Vigorously denies any stimulants.  No alcohol.  No illicit drug use.  Eats relatively healthy.  Has not been exercising regularly.          Objective       Objective:     Vitals:    03/08/24 1124 03/08/24 1127   BP: (!) 144/90 128/84   BP Location: Left arm Left arm   Patient Position: Sitting Sitting   BP Cuff Size: Adult Adult   Pulse: 77 81   Weight: 73.9 kg (163 lb)    Height: 1.676 m (5' 6\")       Body mass index is 26.31 kg/m².  Physical Exam  Constitutional:       General: He is not in acute distress.     Appearance: He is not diaphoretic.   HENT:      Head: Normocephalic and atraumatic.   Eyes:      General: No scleral icterus.     Conjunctiva/sclera: Conjunctivae normal.   Pulmonary:      Effort: Pulmonary effort is normal. No respiratory distress.   Musculoskeletal:         General: No tenderness.   Skin:     Coloration: Skin is not pale.   Neurological:      General: No focal deficit present.      Mental Status: He is alert and oriented to person, place, and time.      Cranial Nerves: No cranial nerve deficit.      Coordination: Coordination normal.   Psychiatric:         Mood and Affect: Mood normal.        "  Behavior: Behavior normal.                Data review -     Cardiovascular imaging:    MRA brain and neck January 2024  1.  No acute intracranial abnormality.  2.  Unremarkable orbits.  3.  Bilateral fetal-type PCA with very diminutive posterior circulation, likely anatomic variant. No flow-limiting stenosis, occlusion, or aneurysm in the intracranial and extracranial circulations.  4.  Normal perfusion.     Blood work:    Blood work 2019  ANCA negative  Rheumatoid factor negative  Factor V Leiden negative    Repeat blood work oct 2019 - hgb 17.5    AbPM Oct 2019:  Mean daytime: 119/72 indicative of well-controlled out of office blood pressure  Nocturnal dip: Appears blunted    Blood work September 2020-hemoglobin 17.3, glucose 82, homocystine 10, GFR 96    [facility] work [facility] October 2021  White count 4.2, hemoglobin 16.8, platelet count 214,  Heterozygous for MTHFR variant  Santiago 2 mutation negative    Blood work November 2023  Antithrombin III normal  Antiphospholipid antibodies unremarkable  Lupus anticoagulant negative  Total cholesterol 203, HDL 62, triglycerides 61,   Homocystine 13.8  A1c 5.0  Lysozyme 6.3  Protein C normal  Protein S normal  ACE enzyme normal  PRATIMA normal    Blood work feb 2024  TFR dna analysis - postive  PTGM - negative  Hgb 17.6  Total c 163, HDL 60, trig 117, LDL 82  Homocysteine 11.6                Medical Decision Making:  Today's Assessment / Status / Plan:     1. Central retinal vein occlusion with macular edema of right eye        2. Hyperhomocystinemia          ASSESSMENT    - Central retinal vein occlusion, recurrent and unexplained -probably multifactorial -patient proved after steroids and retinal injections  - No obvious evidence of connective tissue issues or hypercoaguable state  -MTHFR heterozygous and high homocysteine levels -modestly improved with supplementation  - Mldly elevated hgb -persistent -  without genetic predisposition to PV per heme  - BP at home in  office and on ABPM c/w prehypertension or mild stage 1 hypertension in the past.  Mildly elevated in the office today.  Unclear at home    PLAN:    As I discussed with patient and his mother, aside from ruling out hypercoagulable state there is not much of a role for vascular medicine in the management of central retinal vein occlusion.  I have also been very clear with him that I am not an expert in management of hyperhomocysteinemia    -Restart home blood pressure monitoring  -Consider antihypertensive therapy, likely ARB, if greater than 130/80 at home  -Consider repeat ABPM  -continue folplex  -Continue additional supplements as he is taking  -Continue aspirin  -Recommended donating blood every couple of months  -Follow-up regularly with ophthalmology and retinal specialist  -Report any vision changes to ophthalmology immediately  -Follow-up with hematology, rheumatology and other specialist at [place] as scheduled    We will partner with other providers in the management of established vascular disease and cardiometabolic risk factors.    Studies to Be Obtained: none  Labs to Be Obtained: CBC and homocystine level    Follow up in: 6 weeks    Total time: 31 min - chart review/prep, review of other providers' records, imaging/lab review, face-to-face time for history/examination, ordering, prescribing,  review of results/meds/ treatment plan with patient/family/caregiver, documentation in EMR, care coordination (as needed)     Michael J Bloch, M.D.     Cc:  Dr. CHICO Lovelace

## 2024-05-06 ENCOUNTER — TELEPHONE (OUTPATIENT)
Dept: VASCULAR LAB | Facility: MEDICAL CENTER | Age: 34
End: 2024-05-06
Payer: COMMERCIAL

## 2024-05-06 NOTE — TELEPHONE ENCOUNTER
Established patient  Chart prep for upcoming appointment.    Any pending/incomplete orders from last visit? Yes Labs  Was patient called and reminded to complete pending orders? Yes  Were any records requested?  No    Referral up to date? Yes  Referral attached to appointment (renewals and New patients only)? N/A (established with up-to-date referral)  Virtual appointment? No    Rubi Collier, Med Ass't  [facility]  Ph. [phone]  Fx. [phone]

## 2024-05-09 ENCOUNTER — OFFICE VISIT (OUTPATIENT)
Dept: CARDIOLOGY | Facility: MEDICAL CENTER | Age: 34
End: 2024-05-09
Attending: INTERNAL MEDICINE
Payer: COMMERCIAL

## 2024-05-09 VITALS
BODY MASS INDEX: 26.03 KG/M2 | HEIGHT: 66 IN | SYSTOLIC BLOOD PRESSURE: 121 MMHG | HEART RATE: 76 BPM | DIASTOLIC BLOOD PRESSURE: 85 MMHG | WEIGHT: 162 LBS

## 2024-05-09 DIAGNOSIS — R79.83 HYPERHOMOCYSTINEMIA: ICD-10-CM

## 2024-05-09 DIAGNOSIS — I10 HYPERTENSION, UNSPECIFIED TYPE: ICD-10-CM

## 2024-05-09 DIAGNOSIS — H34.8110 CENTRAL RETINAL VEIN OCCLUSION WITH MACULAR EDEMA OF RIGHT EYE: ICD-10-CM

## 2024-05-09 PROCEDURE — 99213 OFFICE O/P EST LOW 20 MIN: CPT | Performed by: INTERNAL MEDICINE

## 2024-05-09 PROCEDURE — G2211 COMPLEX E/M VISIT ADD ON: HCPCS | Performed by: INTERNAL MEDICINE

## 2024-05-09 PROCEDURE — 3074F SYST BP LT 130 MM HG: CPT | Performed by: INTERNAL MEDICINE

## 2024-05-09 PROCEDURE — 3079F DIAST BP 80-89 MM HG: CPT | Performed by: INTERNAL MEDICINE

## 2024-05-09 RX ORDER — MECOBALAMIN 1000 MCG
TABLET,CHEWABLE ORAL
COMMUNITY

## 2024-05-09 RX ORDER — NIACIN 500 MG
500 TABLET ORAL 3 TIMES DAILY
COMMUNITY
Start: 2024-05-03

## 2024-05-09 NOTE — PROGRESS NOTES
Follow Up VASCULAR VISIT  Subjective:   Rob Castillo is a 33 y.o. male who presents 05/09/24   for vascular followup     Subjective      HPI:  Here for f/u of central retinal vein occlusion  He has been following up regularly with optho at [facility]  They have asked him to decrease his aspirin to 2x weekly  They also suggested he try niacin 1500 mg daily, but he is concerned that it may increase homocysteine level  His vision is stable  He remains on Folplex and is taking additional supplements as well  He does have a follow-up appointment at [place] to see multiple specialist including rheumatology  BPs at home 120s/70s or less  He is off steroids  He is planning to donate blood now that off steroids    Social history -no smoking.  Vigorously denies any stimulants.  No alcohol.  No illicit drug use.  Eats relatively healthy.  Has not been exercising regularly.          Objective       Objective:     There were no vitals filed for this visit.     There is no height or weight on file to calculate BMI.  Physical Exam  Constitutional:       General: He is not in acute distress.     Appearance: He is not diaphoretic.   HENT:      Head: Normocephalic and atraumatic.   Eyes:      General: No scleral icterus.     Conjunctiva/sclera: Conjunctivae normal.   Pulmonary:      Effort: Pulmonary effort is normal. No respiratory distress.   Musculoskeletal:         General: No tenderness.   Skin:     Coloration: Skin is not pale.   Neurological:      General: No focal deficit present.      Mental Status: He is alert and oriented to person, place, and time.      Cranial Nerves: No cranial nerve deficit.      Coordination: Coordination normal.   Psychiatric:         Mood and Affect: Mood normal.         Behavior: Behavior normal.              Data review -     Cardiovascular imaging:    MRA brain and neck January 2024  1.  No acute intracranial abnormality.  2.  Unremarkable orbits.  3.  Bilateral fetal-type PCA with very  diminutive posterior circulation, likely anatomic variant. No flow-limiting stenosis, occlusion, or aneurysm in the intracranial and extracranial circulations.  4.  Normal perfusion.     Blood work:    Blood work 2019  ANCA negative  Rheumatoid factor negative  Factor V Leiden negative    Repeat blood work oct 2019 - hgb 17.5    AbPM Oct 2019:  Mean daytime: 119/72 indicative of well-controlled out of office blood pressure  Nocturnal dip: Appears blunted    Blood work September 2020-hemoglobin 17.3, glucose 82, homocystine 10, GFR 96    [facility] work [facility] October 2021  White count 4.2, hemoglobin 16.8, platelet count 214,  Heterozygous for MTHFR variant  Santiago 2 mutation negative    Blood work November 2023  Antithrombin III normal  Antiphospholipid antibodies unremarkable  Lupus anticoagulant negative  Total cholesterol 203, HDL 62, triglycerides 61,   Homocystine 13.8  A1c 5.0  Lysozyme 6.3  Protein C normal  Protein S normal  ACE enzyme normal  PRATIMA normal    Blood work feb 2024  MHTFR dna analysis - postive  PTGM - negative  Hgb 17.6  Total c 163, HDL 60, trig 117, LDL 82  Homocysteine 11.6    Blood work march 2024  Hgb - 17.2                Medical Decision Making:  Today's Assessment / Status / Plan:     1. Central retinal vein occlusion with macular edema of right eye        2. Hypertension, unspecified type        3. Hyperhomocystinemia          ASSESSMENT    - Central retinal vein occlusion, recurrent and unexplained -probably multifactorial -patient proved after steroids and retinal injections  - No obvious evidence of connective tissue issues or hypercoaguable state  -MTHFR heterozygous and high homocysteine levels -significantly improved with supplementation  - Mldly elevated hgb -persistent -  without genetic predisposition to PV per heme  - BP at home in office and on ABPM c/w prehypertension or mild stage 1 hypertension in the past.  Much improved both at home and in office with lifestyle  mod    PLAN:    As I discussed with patient and his mother, aside from ruling out hypercoagulable state there is not much of a role for vascular medicine in the management of central retinal vein occlusion.  I have also been very clear with him that I am not an expert in management of hyperhomocysteinemia. I once again told the patient and his mom that he should defer to optho at [place] [facility] for recommendations    -continue home blood pressure monitoring  -Consider antihypertensive therapy, likely ARB, if greater than 130/80 at home  -Consider repeat ABPM in future  -continue folplex  -Continue additional supplements as he is taking  -Continue aspirin - but decrease to 2x per week per optho  -Recommended donating blood every couple of months  -Follow-up regularly with ophthalmology and retinal specialist  -Report any vision changes to ophthalmology immediately  -Follow-up with hematology, rheumatology and other specialist at [facility] as scheduled  -trial of niacin as recommended by optho  -recheck homocysteine and cbc prior to next visit  -recheck nmr and lp(a)    We will partner with other providers in the management of established vascular disease and cardiometabolic risk factors.    Studies to Be Obtained: none  Labs to Be Obtained: CBC and homocystine level    Follow up in: 6 weeks    Total time: 31 min - chart review/prep, review of other providers' records, imaging/lab review, face-to-face time for history/examination, ordering, prescribing,  review of results/meds/ treatment plan with patient/family/caregiver, documentation in EMR, care coordination (as needed)     Michael J Bloch, M.D.     Cc: JACY Sepulveda

## 2024-06-13 ENCOUNTER — TELEPHONE (OUTPATIENT)
Dept: VASCULAR LAB | Facility: MEDICAL CENTER | Age: 34
End: 2024-06-13
Payer: COMMERCIAL

## 2024-06-13 NOTE — TELEPHONE ENCOUNTER
Established patient  Chart prep for upcoming appointment.    Any pending/incomplete orders from last visit? Yes Labs  Was patient called and reminded to complete pending orders? Yes LVM  Were any records requested?  No    Referral up to date? Yes  Referral attached to appointment (renewals and New patients only)? N/A (established with up-to-date referral)  Virtual appointment? No    Rubi Collier, Med Ass't  [facility]  Ph. [phone]  Fx. [phone]

## 2024-06-18 ENCOUNTER — HOSPITAL ENCOUNTER (OUTPATIENT)
Dept: LAB | Facility: MEDICAL CENTER | Age: 34
End: 2024-06-18
Attending: INTERNAL MEDICINE
Payer: COMMERCIAL

## 2024-06-18 DIAGNOSIS — H34.8110 CENTRAL RETINAL VEIN OCCLUSION WITH MACULAR EDEMA OF RIGHT EYE: ICD-10-CM

## 2024-06-18 DIAGNOSIS — R79.83 HYPERHOMOCYSTINEMIA: ICD-10-CM

## 2024-06-18 DIAGNOSIS — I10 HYPERTENSION, UNSPECIFIED TYPE: ICD-10-CM

## 2024-06-18 LAB
ANION GAP SERPL CALC-SCNC: 10 MMOL/L (ref 7–16)
BUN SERPL-MCNC: 14 MG/DL (ref 8–22)
CALCIUM SERPL-MCNC: 9.8 MG/DL (ref 8.5–10.5)
CHLORIDE SERPL-SCNC: 103 MMOL/L (ref 96–112)
CO2 SERPL-SCNC: 26 MMOL/L (ref 20–33)
CREAT SERPL-MCNC: 0.85 MG/DL (ref 0.5–1.4)
ERYTHROCYTE [DISTWIDTH] IN BLOOD BY AUTOMATED COUNT: 38.5 FL (ref 35.9–50)
GFR SERPLBLD CREATININE-BSD FMLA CKD-EPI: 118 ML/MIN/1.73 M 2
GLUCOSE SERPL-MCNC: 92 MG/DL (ref 65–99)
HCT VFR BLD AUTO: 48.3 % (ref 42–52)
HCYS SERPL-SCNC: 8.3 UMOL/L
HGB BLD-MCNC: 17.2 G/DL (ref 14–18)
MCH RBC QN AUTO: 31.9 PG (ref 27–33)
MCHC RBC AUTO-ENTMCNC: 35.6 G/DL (ref 32.3–36.5)
MCV RBC AUTO: 89.4 FL (ref 81.4–97.8)
PLATELET # BLD AUTO: 202 K/UL (ref 164–446)
PMV BLD AUTO: 9.6 FL (ref 9–12.9)
POTASSIUM SERPL-SCNC: 4.3 MMOL/L (ref 3.6–5.5)
RBC # BLD AUTO: 5.4 M/UL (ref 4.7–6.1)
SODIUM SERPL-SCNC: 139 MMOL/L (ref 135–145)
WBC # BLD AUTO: 5.2 K/UL (ref 4.8–10.8)

## 2024-06-18 PROCEDURE — 36415 COLL VENOUS BLD VENIPUNCTURE: CPT

## 2024-06-18 PROCEDURE — 83704 LIPOPROTEIN BLD QUAN PART: CPT

## 2024-06-18 PROCEDURE — 80061 LIPID PANEL: CPT

## 2024-06-18 PROCEDURE — 83090 ASSAY OF HOMOCYSTEINE: CPT

## 2024-06-18 PROCEDURE — 80048 BASIC METABOLIC PNL TOTAL CA: CPT

## 2024-06-18 PROCEDURE — 85027 COMPLETE CBC AUTOMATED: CPT

## 2024-06-20 ENCOUNTER — OFFICE VISIT (OUTPATIENT)
Dept: CARDIOLOGY | Facility: MEDICAL CENTER | Age: 34
End: 2024-06-20
Attending: INTERNAL MEDICINE
Payer: COMMERCIAL

## 2024-06-20 VITALS
HEART RATE: 79 BPM | DIASTOLIC BLOOD PRESSURE: 76 MMHG | WEIGHT: 163 LBS | SYSTOLIC BLOOD PRESSURE: 127 MMHG | BODY MASS INDEX: 26.2 KG/M2 | HEIGHT: 66 IN

## 2024-06-20 DIAGNOSIS — H34.8110 CENTRAL RETINAL VEIN OCCLUSION WITH MACULAR EDEMA OF RIGHT EYE: ICD-10-CM

## 2024-06-20 DIAGNOSIS — D75.1 POLYCYTHEMIA: ICD-10-CM

## 2024-06-20 DIAGNOSIS — I10 HYPERTENSION, UNSPECIFIED TYPE: ICD-10-CM

## 2024-06-20 DIAGNOSIS — R79.83 HYPERHOMOCYSTINEMIA: ICD-10-CM

## 2024-06-20 PROCEDURE — 3078F DIAST BP <80 MM HG: CPT | Performed by: INTERNAL MEDICINE

## 2024-06-20 PROCEDURE — 3074F SYST BP LT 130 MM HG: CPT | Performed by: INTERNAL MEDICINE

## 2024-06-20 PROCEDURE — 99212 OFFICE O/P EST SF 10 MIN: CPT

## 2024-06-20 PROCEDURE — 99213 OFFICE O/P EST LOW 20 MIN: CPT | Performed by: INTERNAL MEDICINE

## 2024-06-20 NOTE — PROGRESS NOTES
"  Follow Up VASCULAR VISIT  Subjective:   Rob Castillo is a 33 y.o. male who presents 06/20/24   for vascular followup     Subjective      HPI:  Here for f/u of central retinal vein occlusion  No change in vision  He has been following up regularly with optho at [place]  reMains on aspirin 2 days per week at rec of optho  Rheum suggested he not try niacin  He is taking multiple supplements to lower homocysteine  He has seen rheum at [place] and in [facility]  Has f/u appt with heme at [place] in jan,  He has not yet given blood  BPs at home 120s/70s or less  He is off steroids    Social history -no smoking.  Vigorously denies any stimulants.  No alcohol.  No illicit drug use.  Eats relatively healthy.  Has not been exercising regularly.          Objective       Objective:     Vitals:    06/20/24 1540   BP: 127/76   BP Location: Left arm   Patient Position: Sitting   BP Cuff Size: Adult   Pulse: 79   Weight: 73.9 kg (163 lb)   Height: 1.676 m (5' 6\")        Body mass index is 26.31 kg/m².  Physical Exam  Constitutional:       General: He is not in acute distress.     Appearance: He is not diaphoretic.   HENT:      Head: Normocephalic and atraumatic.   Eyes:      General: No scleral icterus.     Conjunctiva/sclera: Conjunctivae normal.   Pulmonary:      Effort: Pulmonary effort is normal. No respiratory distress.   Musculoskeletal:         General: No tenderness.   Skin:     Coloration: Skin is not pale.   Neurological:      General: No focal deficit present.      Mental Status: He is alert and oriented to person, place, and time.      Cranial Nerves: No cranial nerve deficit.      Coordination: Coordination normal.   Psychiatric:         Mood and Affect: Mood normal.         Behavior: Behavior normal.   -           Data review -     Cardiovascular imaging:    MRA brain and neck January 2024  1.  No acute intracranial abnormality.  2.  Unremarkable orbits.  3.  Bilateral fetal-type PCA with very diminutive " posterior circulation, likely anatomic variant. No flow-limiting stenosis, occlusion, or aneurysm in the intracranial and extracranial circulations.  4.  Normal perfusion.     Blood work:    Blood work 2019  ANCA negative  Rheumatoid factor negative  Factor V Leiden negative    Repeat blood work oct 2019 - hgb 17.5    AbPM Oct 2019:  Mean daytime: 119/72 indicative of well-controlled out of office blood pressure  Nocturnal dip: Appears blunted    Blood work September 2020-hemoglobin 17.3, glucose 82, homocystine 10, GFR 96    Blood work [facility] October 2021  White count 4.2, hemoglobin 16.8, platelet count 214,  Heterozygous for MTHFR variant  Santiago 2 mutation negative    Blood work November 2023  Antithrombin III normal  Antiphospholipid antibodies unremarkable  Lupus anticoagulant negative  Total cholesterol 203, HDL 62, triglycerides 61,   Homocystine 13.8  A1c 5.0  Lysozyme 6.3  Protein C normal  Protein S normal  ACE enzyme normal  PRATIMA normal    Blood work feb 2024  MHTFR dna analysis - postive  PTGM - negative  Hgb 17.6  Total c 163, HDL 60, trig 117, LDL 82  Homocysteine 11.6    Blood work march 2024  Hgb - 17.2        Lab Results   Component Value Date    SODIUM 139 06/18/2024    POTASSIUM 4.3 06/18/2024    CHLORIDE 103 06/18/2024    CO2 26 06/18/2024    GLUCOSE 92 06/18/2024    BUN 14 06/18/2024    CREATININE 0.85 06/18/2024        Lab Results   Component Value Date    WBC 5.2 06/18/2024    RBC 5.40 06/18/2024    HEMOGLOBIN 17.2 06/18/2024    HEMATOCRIT 48.3 06/18/2024    MCV 89.4 06/18/2024    MCH 31.9 06/18/2024    MCHC 35.6 06/18/2024    MPV 9.6 06/18/2024      Homocyteine 8.3 (june 2024)    Medical Decision Making:  Today's Assessment / Status / Plan:     1. Central retinal vein occlusion with macular edema of right eye        2. Hyperhomocystinemia        3. Hypertension, unspecified type        4. Polycythemia          ASSESSMENT    - Central retinal vein occlusion, recurrent and unexplained  -probably multifactorial -patient proved after steroids and retinal injections  - No obvious evidence of connective tissue issues or hypercoaguable state  -MTHFR heterozygous and high homocysteine levels -significantly improved with supplementation  - Mldly elevated hgb -persistent -  without genetic predisposition to PV per heme  - BP at home in office and on ABPM c/w prehypertension or mild stage 1 hypertension in the past.  Much improved both at home and in office with lifestyle mod    PLAN:    As I discussed previously on multiple occassions with patient and his mother, aside from ruling out hypercoagulable state there is not much of a role for vascular medicine in the management of central retinal vein occlusion.  I have also been very clear with him that I am not an expert in management of hyperhomocysteinemia. I have told the patient and his mom that he should defer to optho at [place] and his other specialists for further recommendations    -continue home blood pressure monitoring  -Consider antihypertensive therapy, likely ARB, if greater than 130/80 at home  -Consider repeat ABPM in future  -Continue additional supplements to lower homocysteine levels - has shown significant improvement, but as we discussed the potential for AEs at this dose is unkown  -Continue aspirin - but decrease to 2x per week per optho  -again recommended donating blood every couple of months  -Follow-up regularly with ophthalmology and retinal specialist  -Report any vision changes to ophthalmology immediately  -Follow-up with hematology, rheumatology and other specialist at [place] as scheduled  -recheck homocysteine and cbc prior to next visit  -await results of nmr and lp(a)    We will partner with other providers in the management of established vascular disease and cardiometabolic risk factors.    Studies to Be Obtained: none  Labs to Be Obtained: CBC and homocystine level    Follow up in: 6 weeks    Total time: 31 min -  chart review/prep, review of other providers' records, imaging/lab review, face-to-face time for history/examination, ordering, prescribing,  review of results/meds/ treatment plan with patient/family/caregiver, documentation in EMR, care coordination (as needed)     Michael J Bloch, M.D.

## 2024-06-23 LAB
CHOLEST SERPL-MCNC: 195 MG/DL
HDL PARTICAL NO Q4363: 32.8 UMOL/L
HDL SIZE Q4361: 8.5 NM
HDLC SERPL-MCNC: 45 MG/DL (ref 40–59)
HLD.LARGE SERPL-SCNC: <2.8 UMOL/L
L VLDL PART NO Q4357: 1.9 NMOL/L
LDL SERPL QN: 20.6 NM
LDL SERPL-SCNC: 1584 NMOL/L
LDL SMALL SERPL-SCNC: 791 NMOL/L
LDLC SERPL CALC-MCNC: 130 MG/DL
PATHOLOGY STUDY: ABNORMAL
TRIGL SERPL-MCNC: 99 MG/DL (ref 30–149)
VLDL SIZE Q4362: 44.5 NM

## 2024-07-24 ENCOUNTER — TELEPHONE (OUTPATIENT)
Dept: VASCULAR LAB | Facility: MEDICAL CENTER | Age: 34
End: 2024-07-24
Payer: COMMERCIAL

## 2024-08-01 ENCOUNTER — APPOINTMENT (OUTPATIENT)
Dept: CARDIOLOGY | Facility: MEDICAL CENTER | Age: 34
End: 2024-08-01
Attending: INTERNAL MEDICINE
Payer: COMMERCIAL

## 2024-08-13 ENCOUNTER — HOSPITAL ENCOUNTER (OUTPATIENT)
Dept: LAB | Facility: MEDICAL CENTER | Age: 34
End: 2024-08-13
Attending: INTERNAL MEDICINE
Payer: COMMERCIAL

## 2024-08-13 DIAGNOSIS — D75.1 POLYCYTHEMIA: ICD-10-CM

## 2024-08-13 DIAGNOSIS — R79.83 HYPERHOMOCYSTINEMIA: ICD-10-CM

## 2024-08-13 DIAGNOSIS — H34.8110 CENTRAL RETINAL VEIN OCCLUSION WITH MACULAR EDEMA OF RIGHT EYE: ICD-10-CM

## 2024-08-13 LAB
CREAT SERPL-MCNC: 0.97 MG/DL (ref 0.5–1.4)
ERYTHROCYTE [DISTWIDTH] IN BLOOD BY AUTOMATED COUNT: 38.2 FL (ref 35.9–50)
GFR SERPLBLD CREATININE-BSD FMLA CKD-EPI: 106 ML/MIN/1.73 M 2
HCT VFR BLD AUTO: 46.6 % (ref 42–52)
HCYS SERPL-SCNC: 6.03 UMOL/L
HGB BLD-MCNC: 16.7 G/DL (ref 14–18)
MCH RBC QN AUTO: 31.5 PG (ref 27–33)
MCHC RBC AUTO-ENTMCNC: 35.8 G/DL (ref 32.3–36.5)
MCV RBC AUTO: 87.9 FL (ref 81.4–97.8)
PLATELET # BLD AUTO: 173 K/UL (ref 164–446)
PMV BLD AUTO: 8.8 FL (ref 9–12.9)
RBC # BLD AUTO: 5.3 M/UL (ref 4.7–6.1)
WBC # BLD AUTO: 3.8 K/UL (ref 4.8–10.8)

## 2024-08-13 PROCEDURE — 85027 COMPLETE CBC AUTOMATED: CPT

## 2024-08-13 PROCEDURE — 36415 COLL VENOUS BLD VENIPUNCTURE: CPT

## 2024-08-13 PROCEDURE — 82565 ASSAY OF CREATININE: CPT

## 2024-08-13 PROCEDURE — 83090 ASSAY OF HOMOCYSTEINE: CPT

## 2024-08-13 PROCEDURE — 86039 ANTINUCLEAR ANTIBODIES (ANA): CPT

## 2024-08-19 LAB
ANA PAT SER IF-IMP: NORMAL
NUCLEAR IGG SER QL IF: NORMAL

## 2024-08-23 NOTE — PROGRESS NOTES
Established patient  Chart prep for upcoming appointment.    Any pending/incomplete orders from last visit? No, all orders completed.  Was patient called and reminded to complete pending orders? N/A orders complete  Were any records requested?  No    Referral up to date? Yes  Referral attached to appointment (renewals and New patients only)? N/A (established with up-to-date referral)  Virtual appointment? No          Andria Edge, Medical Assistant   [facility]   Ph: [phone]  Fx: [phone]

## 2024-08-29 ENCOUNTER — APPOINTMENT (OUTPATIENT)
Dept: VASCULAR LAB | Facility: MEDICAL CENTER | Age: 34
End: 2024-08-29
Payer: COMMERCIAL

## 2024-12-10 ENCOUNTER — TELEPHONE (OUTPATIENT)
Dept: VASCULAR LAB | Facility: MEDICAL CENTER | Age: 34
End: 2024-12-10
Payer: COMMERCIAL

## 2024-12-10 NOTE — TELEPHONE ENCOUNTER
Established patient  Chart prep for upcoming appointment.    Any pending/incomplete orders from last visit? Yes Labs  Was patient called and reminded to complete pending orders? Yes  Were any records requested?  No    Referral up to date? Yes  Referral attached to appointment (renewals and New patients only)? N/A (established with up-to-date referral)  Virtual appointment? No    Rubi Collier, Med Ass't  [facility]  Ph. [phone]  Fx. [phone]

## 2024-12-13 ENCOUNTER — HOSPITAL ENCOUNTER (OUTPATIENT)
Dept: LAB | Facility: MEDICAL CENTER | Age: 34
End: 2024-12-13
Attending: INTERNAL MEDICINE
Payer: COMMERCIAL

## 2024-12-13 DIAGNOSIS — H34.8110 CENTRAL RETINAL VEIN OCCLUSION WITH MACULAR EDEMA OF RIGHT EYE: ICD-10-CM

## 2024-12-13 DIAGNOSIS — D75.1 POLYCYTHEMIA: ICD-10-CM

## 2024-12-13 DIAGNOSIS — I10 HYPERTENSION, UNSPECIFIED TYPE: ICD-10-CM

## 2024-12-13 DIAGNOSIS — R79.83 HYPERHOMOCYSTINEMIA: ICD-10-CM

## 2024-12-13 LAB
ALBUMIN SERPL BCP-MCNC: 4.8 G/DL (ref 3.2–4.9)
ALBUMIN/GLOB SERPL: 1.5 G/DL
ALP SERPL-CCNC: 85 U/L (ref 30–99)
ALT SERPL-CCNC: 11 U/L (ref 2–50)
ANION GAP SERPL CALC-SCNC: 13 MMOL/L (ref 7–16)
AST SERPL-CCNC: 18 U/L (ref 12–45)
BILIRUB SERPL-MCNC: 0.6 MG/DL (ref 0.1–1.5)
BUN SERPL-MCNC: 15 MG/DL (ref 8–22)
CALCIUM ALBUM COR SERPL-MCNC: 8.9 MG/DL (ref 8.5–10.5)
CALCIUM SERPL-MCNC: 9.5 MG/DL (ref 8.4–10.2)
CHLORIDE SERPL-SCNC: 101 MMOL/L (ref 96–112)
CHOLEST SERPL-MCNC: 173 MG/DL (ref 100–199)
CO2 SERPL-SCNC: 26 MMOL/L (ref 20–33)
CREAT SERPL-MCNC: 1.01 MG/DL (ref 0.5–1.4)
FASTING STATUS PATIENT QL REPORTED: NORMAL
GFR SERPLBLD CREATININE-BSD FMLA CKD-EPI: 101 ML/MIN/1.73 M 2
GLOBULIN SER CALC-MCNC: 3.2 G/DL (ref 1.9–3.5)
GLUCOSE SERPL-MCNC: 92 MG/DL (ref 65–99)
HCYS SERPL-SCNC: 8.51 UMOL/L
HDLC SERPL-MCNC: 52 MG/DL
LDLC SERPL CALC-MCNC: 108 MG/DL
POTASSIUM SERPL-SCNC: 4.3 MMOL/L (ref 3.6–5.5)
PROT SERPL-MCNC: 8 G/DL (ref 6–8.2)
SODIUM SERPL-SCNC: 140 MMOL/L (ref 135–145)
TRIGL SERPL-MCNC: 64 MG/DL (ref 0–149)

## 2024-12-13 PROCEDURE — 84403 ASSAY OF TOTAL TESTOSTERONE: CPT

## 2024-12-13 PROCEDURE — 36415 COLL VENOUS BLD VENIPUNCTURE: CPT

## 2024-12-13 PROCEDURE — 83090 ASSAY OF HOMOCYSTEINE: CPT

## 2024-12-13 PROCEDURE — 84402 ASSAY OF FREE TESTOSTERONE: CPT

## 2024-12-13 PROCEDURE — 84270 ASSAY OF SEX HORMONE GLOBUL: CPT

## 2024-12-13 PROCEDURE — 80061 LIPID PANEL: CPT

## 2024-12-13 PROCEDURE — 80053 COMPREHEN METABOLIC PANEL: CPT

## 2024-12-16 LAB
SHBG SERPL-SCNC: 51 NMOL/L (ref 17–56)
TESTOST FREE MFR SERPL: 1.6 % (ref 1.6–2.9)
TESTOST FREE SERPL-MCNC: 126 PG/ML (ref 47–244)
TESTOST SERPL-MCNC: 801 NG/DL (ref 300–1080)

## 2024-12-19 ENCOUNTER — OFFICE VISIT (OUTPATIENT)
Dept: CARDIOLOGY | Facility: MEDICAL CENTER | Age: 34
End: 2024-12-19
Attending: INTERNAL MEDICINE
Payer: COMMERCIAL

## 2024-12-19 VITALS
HEIGHT: 66 IN | SYSTOLIC BLOOD PRESSURE: 125 MMHG | WEIGHT: 154 LBS | HEART RATE: 81 BPM | DIASTOLIC BLOOD PRESSURE: 87 MMHG | BODY MASS INDEX: 24.75 KG/M2

## 2024-12-19 DIAGNOSIS — H34.8110 CENTRAL RETINAL VEIN OCCLUSION WITH MACULAR EDEMA OF RIGHT EYE: ICD-10-CM

## 2024-12-19 DIAGNOSIS — R79.83 HYPERHOMOCYSTINEMIA: ICD-10-CM

## 2024-12-19 DIAGNOSIS — D75.1 POLYCYTHEMIA: ICD-10-CM

## 2024-12-19 DIAGNOSIS — E78.00 HYPERCHOLESTEREMIA: ICD-10-CM

## 2024-12-19 DIAGNOSIS — I10 HYPERTENSION, UNSPECIFIED TYPE: ICD-10-CM

## 2024-12-19 PROCEDURE — 3074F SYST BP LT 130 MM HG: CPT | Performed by: INTERNAL MEDICINE

## 2024-12-19 PROCEDURE — 3079F DIAST BP 80-89 MM HG: CPT | Performed by: INTERNAL MEDICINE

## 2024-12-19 PROCEDURE — 99212 OFFICE O/P EST SF 10 MIN: CPT

## 2024-12-19 PROCEDURE — 99214 OFFICE O/P EST MOD 30 MIN: CPT | Performed by: INTERNAL MEDICINE

## 2024-12-19 ASSESSMENT — FIBROSIS 4 INDEX: FIB4 SCORE: 1

## 2024-12-19 NOTE — PROGRESS NOTES
"  Follow Up VASCULAR VISIT  Subjective:   Rob Castillo is a 34 y.o. male who presents 12/19/24   for vascular followup     Subjective      HPI:  Here for f/u of central retinal vein occlusion  No change in vision  He has been following up regularly with optho at [place]  reMains on aspirin 2 days per week at rec of opt  He is taking multiple supplements to lower homocysteine  He has seen rheum at [place] and in [facility]  He has not yet given blood  BPs at home a bit higher than previous.  Mostly 120s over 70 but occasionally 130s over 80s  He is off steroids or any other interfering substances    Social history -no smoking.  Vigorously denies any stimulants.  No alcohol.  No illicit drug use.  Eats relatively healthy.  Has not been exercising regularly.          Objective       Objective:     Vitals:    12/19/24 1457 12/19/24 1501   BP: 130/84 125/87   BP Location: Left arm Left arm   Patient Position: Sitting Sitting   BP Cuff Size: Adult Adult   Pulse: 88 81   Weight: 69.9 kg (154 lb)    Height: 1.676 m (5' 6\")         Body mass index is 24.86 kg/m².  Physical Exam  Constitutional:       General: He is not in acute distress.     Appearance: He is not diaphoretic.   HENT:      Head: Normocephalic and atraumatic.   Eyes:      General: No scleral icterus.     Conjunctiva/sclera: Conjunctivae normal.   Pulmonary:      Effort: Pulmonary effort is normal. No respiratory distress.   Musculoskeletal:         General: No tenderness.   Skin:     Coloration: Skin is not pale.   Neurological:      General: No focal deficit present.      Mental Status: He is alert and oriented to person, place, and time.      Cranial Nerves: No cranial nerve deficit.      Coordination: Coordination normal.   Psychiatric:         Mood and Affect: Mood normal.         Behavior: Behavior normal.     -  Lab Results   Component Value Date    CHOLSTRLTOT 173 12/13/2024     (H) 12/13/2024    HDL 52 12/13/2024    TRIGLYCERIDE 64 " "12/13/2024    LDLPART 20.6 (L) 06/18/2024    LDLPART 1584 (H) 06/18/2024    SMLLDL 791 (H) 06/18/2024    LHDLPART <2.8 (L) 06/18/2024    LVLDLPT 1.9 06/18/2024    LDLCHOL 130 (H) 06/18/2024    HDLSIZE 8.5 (L) 06/18/2024    VLDLSIZE 44.5 06/18/2024    HDLPART 32.8 (L) 06/18/2024           Data review -     Cardiovascular imaging:    MRA brain and neck January 2024  1.  No acute intracranial abnormality.  2.  Unremarkable orbits.  3.  Bilateral fetal-type PCA with very diminutive posterior circulation, likely anatomic variant. No flow-limiting stenosis, occlusion, or aneurysm in the intracranial and extracranial circulations.  4.  Normal perfusion.     Blood work:    Blood work 2019  ANCA negative  Rheumatoid factor negative  Factor V Leiden negative    Repeat blood work oct 2019 - hgb 17.5    AbPM Oct 2019:  Mean daytime: 119/72 indicative of well-controlled out of office blood pressure  Nocturnal dip: Appears blunted    Blood work September 2020-hemoglobin 17.3, glucose 82, homocystine 10, GFR 96    Blood work [facility] October 2021  White count 4.2, hemoglobin 16.8, platelet count 214,  Heterozygous for MTHFR variant  Santiago 2 mutation negative    Blood work November 2023  Antithrombin III normal  Antiphospholipid antibodies unremarkable  Lupus anticoagulant negative  Total cholesterol 203, HDL 62, triglycerides 61,   Homocystine 13.8  A1c 5.0  Lysozyme 6.3  Protein C normal  Protein S normal  ACE enzyme normal  PRATIMA normal    Blood work feb 2024  TFR dna analysis - postive  PTGM - negative  Hgb 17.6  Total c 163, HDL 60, trig 117, LDL 82  Homocysteine 11.6    Blood work march 2024  Hgb - 17.2    Homocyteine 8.3 (june 2024)    Blood work December 2024  Testosterone 801  Homocystine 8.51        Lab Results   Component Value Date/Time    CHOLSTRLTOT 173 12/13/2024 12:40 PM     (H) 12/13/2024 12:40 PM    HDL 52 12/13/2024 12:40 PM    TRIGLYCERIDE 64 12/13/2024 12:40 PM       No results found for: \"LIPOPROTA\" " "  No results found for: \"APOB\"   No results found for: \"CRPHIGHSEN\"     Lab Results   Component Value Date/Time    SODIUM 140 12/13/2024 12:40 PM    POTASSIUM 4.3 12/13/2024 12:40 PM    CHLORIDE 101 12/13/2024 12:40 PM    CO2 26 12/13/2024 12:40 PM    GLUCOSE 92 12/13/2024 12:40 PM    BUN 15 12/13/2024 12:40 PM    CREATININE 1.01 12/13/2024 12:40 PM     Lab Results   Component Value Date/Time    ALKPHOSPHAT 85 12/13/2024 12:40 PM    ASTSGOT 18 12/13/2024 12:40 PM    ALTSGPT 11 12/13/2024 12:40 PM    TBILIRUBIN 0.6 12/13/2024 12:40 PM         Lab Results   Component Value Date    WBC 3.8 (L) 08/13/2024    RBC 5.30 08/13/2024    HEMOGLOBIN 16.7 08/13/2024    HEMATOCRIT 46.6 08/13/2024    MCV 87.9 08/13/2024    MCH 31.5 08/13/2024    MCHC 35.8 08/13/2024    MPV 8.8 (L) 08/13/2024        Medical Decision Making:  Today's Assessment / Status / Plan:     1. Central retinal vein occlusion with macular edema of right eye        2. Hyperhomocystinemia  HOMOCYSTEINE      3. Hypertension, unspecified type  Comp Metabolic Panel      4. Polycythemia  CBC WITHOUT DIFFERENTIAL      5. Hypercholesteremia  LipoFit by NMR    LIPOPROTEIN A        ASSESSMENT    - Central retinal vein occlusion, recurrent and unexplained -probably multifactorial -patient proved after steroids and retinal injections  - No obvious evidence of connective tissue issues or hypercoaguable state  -MTHFR heterozygous and high homocysteine levels -significantly improved with supplementation  - Mldly elevated hgb -persistent -  without genetic predisposition to PV per heme  - BP at home in office and on ABPM c/w prehypertension or mild stage 1.  Much improved both at home and in office with lifestyle mod, but has increased a bit since last visit  -Mild dyslipidemia    PLAN:    As I discussed previously on multiple occassions with patient and his mother, aside from ruling out hypercoagulable state there is not much of a role for vascular medicine in the management " of central retinal vein occlusion.  I have also been very clear with him that I am not an expert in management of hyperhomocysteinemia. I have told the patient and his mom that he should defer to optho at [place] and his other specialists for further recommendations    -continue home blood pressure monitoring  -Consider antihypertensive therapy, likely ARB, if greater than 130/80 at home  -Consider repeat ABPM in future  -Continue additional supplements to lower homocysteine levels - has shown significant improvement, but as we discussed the potential for AEs at this dose is unkown  -Continue aspirin 2x per week per optho  -again recommended donating blood every couple of months  -Follow-up regularly with ophthalmology and retinal specialist  -Report any vision changes to ophthalmology immediately  -Follow-up with hematology, rheumatology and other specialist at [place] as scheduled  -recheck homocysteine and cbc prior to next visit  -Recheck NMR lipid profile and lipoprotein a prior to next visit  -Increase mono and polyunsaturated fats.  -Increase frequency of exercise    We will partner with other providers in the management of established vascular disease and cardiometabolic risk factors.    Studies to Be Obtained: none  Labs to Be Obtained: As above prior to next visit    Follow up in: 3 months    Michael J Bloch, M.D.

## 2025-02-17 ENCOUNTER — TELEPHONE (OUTPATIENT)
Dept: HEALTH INFORMATION MANAGEMENT | Facility: OTHER | Age: 35
End: 2025-02-17
Payer: COMMERCIAL

## 2025-03-25 ENCOUNTER — TELEPHONE (OUTPATIENT)
Dept: VASCULAR LAB | Facility: MEDICAL CENTER | Age: 35
End: 2025-03-25
Payer: COMMERCIAL

## 2025-03-25 DIAGNOSIS — H34.8110 CENTRAL RETINAL VEIN OCCLUSION WITH MACULAR EDEMA OF RIGHT EYE: ICD-10-CM

## 2025-03-25 DIAGNOSIS — R79.83 HYPERHOMOCYSTINEMIA: ICD-10-CM

## 2025-03-25 NOTE — TELEPHONE ENCOUNTER
Established patient  Chart prep for upcoming appointment.    Any pending/incomplete orders from last visit? Yes Labs  Was patient called and reminded to complete pending orders? Yes  Were any records requested?  No    Referral up to date? Yes renewal ordered, sent to provider to co-sign, AND new referral attached to upcoming appointment.    Referral attached to appointment (renewals and New patients only)? Yes renewal ordered and sent to provider to co-sign   Virtual appointment? No    Rubi Collier, Med Ass't  [facility]  Ph. [phone]  Fx. [phone]       Weakness

## 2025-03-27 ENCOUNTER — HOSPITAL ENCOUNTER (OUTPATIENT)
Dept: LAB | Facility: MEDICAL CENTER | Age: 35
End: 2025-03-27
Attending: INTERNAL MEDICINE
Payer: COMMERCIAL

## 2025-03-27 DIAGNOSIS — E78.00 HYPERCHOLESTEREMIA: ICD-10-CM

## 2025-03-27 DIAGNOSIS — R79.83 HYPERHOMOCYSTINEMIA: ICD-10-CM

## 2025-03-27 DIAGNOSIS — I10 HYPERTENSION, UNSPECIFIED TYPE: ICD-10-CM

## 2025-03-27 DIAGNOSIS — D75.1 POLYCYTHEMIA: ICD-10-CM

## 2025-03-27 LAB
ALBUMIN SERPL BCP-MCNC: 4.8 G/DL (ref 3.2–4.9)
ALBUMIN/GLOB SERPL: 1.3 G/DL
ALP SERPL-CCNC: 78 U/L (ref 30–99)
ALT SERPL-CCNC: 22 U/L (ref 2–50)
ANION GAP SERPL CALC-SCNC: 12 MMOL/L (ref 7–16)
AST SERPL-CCNC: 26 U/L (ref 12–45)
BILIRUB SERPL-MCNC: 0.7 MG/DL (ref 0.1–1.5)
BUN SERPL-MCNC: 14 MG/DL (ref 8–22)
CALCIUM ALBUM COR SERPL-MCNC: 9.3 MG/DL (ref 8.5–10.5)
CALCIUM SERPL-MCNC: 9.9 MG/DL (ref 8.5–10.5)
CHLORIDE SERPL-SCNC: 103 MMOL/L (ref 96–112)
CO2 SERPL-SCNC: 26 MMOL/L (ref 20–33)
CREAT SERPL-MCNC: 1.11 MG/DL (ref 0.5–1.4)
ERYTHROCYTE [DISTWIDTH] IN BLOOD BY AUTOMATED COUNT: 39.1 FL (ref 35.9–50)
GFR SERPLBLD CREATININE-BSD FMLA CKD-EPI: 90 ML/MIN/1.73 M 2
GLOBULIN SER CALC-MCNC: 3.6 G/DL (ref 1.9–3.5)
GLUCOSE SERPL-MCNC: 90 MG/DL (ref 65–99)
HCT VFR BLD AUTO: 50.5 % (ref 42–52)
HCYS SERPL-SCNC: 7.87 UMOL/L
HGB BLD-MCNC: 17.7 G/DL (ref 14–18)
MCH RBC QN AUTO: 31.4 PG (ref 27–33)
MCHC RBC AUTO-ENTMCNC: 35 G/DL (ref 32.3–36.5)
MCV RBC AUTO: 89.7 FL (ref 81.4–97.8)
PLATELET # BLD AUTO: 205 K/UL (ref 164–446)
PMV BLD AUTO: 9.3 FL (ref 9–12.9)
POTASSIUM SERPL-SCNC: 4.6 MMOL/L (ref 3.6–5.5)
PROT SERPL-MCNC: 8.4 G/DL (ref 6–8.2)
RBC # BLD AUTO: 5.63 M/UL (ref 4.7–6.1)
SODIUM SERPL-SCNC: 141 MMOL/L (ref 135–145)
WBC # BLD AUTO: 4.2 K/UL (ref 4.8–10.8)

## 2025-03-27 PROCEDURE — 83704 LIPOPROTEIN BLD QUAN PART: CPT

## 2025-03-27 PROCEDURE — 83090 ASSAY OF HOMOCYSTEINE: CPT

## 2025-03-27 PROCEDURE — 80053 COMPREHEN METABOLIC PANEL: CPT

## 2025-03-27 PROCEDURE — 36415 COLL VENOUS BLD VENIPUNCTURE: CPT

## 2025-03-27 PROCEDURE — 85027 COMPLETE CBC AUTOMATED: CPT

## 2025-03-27 PROCEDURE — 80061 LIPID PANEL: CPT

## 2025-03-28 ENCOUNTER — RESULTS FOLLOW-UP (OUTPATIENT)
Dept: CARDIOLOGY | Facility: MEDICAL CENTER | Age: 35
End: 2025-03-28
Payer: COMMERCIAL

## 2025-03-31 LAB
CHOLEST SERPL-MCNC: 199 MG/DL
HDL PARTICAL NO Q4363: 37.4 UMOL/L
HDL SIZE Q4361: 8.7 NM
HDLC SERPL-MCNC: 57 MG/DL (ref 40–59)
HLD.LARGE SERPL-SCNC: 4.3 UMOL/L
L VLDL PART NO Q4357: <1.5 NMOL/L
LDL SERPL QN: 20.8 NM
LDL SERPL-SCNC: 1209 NMOL/L
LDL SMALL SERPL-SCNC: 614 NMOL/L
LDLC SERPL CALC-MCNC: 124 MG/DL
PATHOLOGY STUDY: ABNORMAL
TRIGL SERPL-MCNC: 88 MG/DL (ref 30–149)
VLDL SIZE Q4362: 47.1 NM

## 2025-04-04 ENCOUNTER — OFFICE VISIT (OUTPATIENT)
Dept: VASCULAR LAB | Facility: MEDICAL CENTER | Age: 35
End: 2025-04-04
Attending: INTERNAL MEDICINE
Payer: COMMERCIAL

## 2025-04-04 ENCOUNTER — TELEPHONE (OUTPATIENT)
Dept: VASCULAR LAB | Facility: MEDICAL CENTER | Age: 35
End: 2025-04-04
Payer: COMMERCIAL

## 2025-04-04 VITALS
SYSTOLIC BLOOD PRESSURE: 123 MMHG | HEIGHT: 66 IN | WEIGHT: 154 LBS | DIASTOLIC BLOOD PRESSURE: 79 MMHG | BODY MASS INDEX: 24.75 KG/M2 | HEART RATE: 85 BPM

## 2025-04-04 DIAGNOSIS — R79.83 HYPERHOMOCYSTINEMIA: ICD-10-CM

## 2025-04-04 DIAGNOSIS — H34.8110 CENTRAL RETINAL VEIN OCCLUSION WITH MACULAR EDEMA OF RIGHT EYE: ICD-10-CM

## 2025-04-04 PROCEDURE — 99213 OFFICE O/P EST LOW 20 MIN: CPT | Performed by: INTERNAL MEDICINE

## 2025-04-04 PROCEDURE — 3078F DIAST BP <80 MM HG: CPT | Performed by: INTERNAL MEDICINE

## 2025-04-04 PROCEDURE — 3074F SYST BP LT 130 MM HG: CPT | Performed by: INTERNAL MEDICINE

## 2025-04-04 PROCEDURE — 99212 OFFICE O/P EST SF 10 MIN: CPT

## 2025-04-04 ASSESSMENT — FIBROSIS 4 INDEX: FIB4 SCORE: 0.87

## 2025-04-04 NOTE — TELEPHONE ENCOUNTER
Erum Post, PharmPOLO Collier, Med Ass't  Cc: Michael J Bloch, M.D.; P [facility] - can you call PCP tomorrow and ask them to send us a Referral to Vascular Medicine please?          Previous Messages       ----- Message -----  From: Michael J Bloch, M.D.  Sent: 3/31/2025   4:56 PM PDT  To: Erum Post PharmD  Subject: FW: REFERRAL                                    Not sure what to do with this information....  Mb  ----- Message -----  From: Valarie Hutson  Sent: 3/31/2025   4:05 PM PDT  To: Michael J Bloch, M.D.  Subject: REFERRAL                                        Hello,    The patient has an HMO plan that requires all referrals to be placed by the patient's PCP. I did send a letter to the patient informing them as well. I do see the patient has an upcoming appointment and that unfortunately is not yet authorized.    Thank you, Evangelina

## 2025-04-04 NOTE — PROGRESS NOTES
"  Follow Up VASCULAR VISIT  Subjective:   Rob Castillo is a 34 y.o. male who presents 04/04/25   for vascular followup     Subjective      HPI:  Here for f/u of central retinal vein occlusion  No change in vision  He has been following up regularly with optho at [place]  He saw hematology at [place] as well  He is taking multiple supplements to lower homocysteine  He has seen rheum at [place] and in [facility]  He has not yet given blood  BPs at home mostly less than 130/80  No blood pressure medications  No lipid-lowering therapy  He is off steroids or any other interfering substances    Social history -no smoking.  Vigorously denies any stimulants.  No alcohol.  No illicit drug use.  Eats relatively healthy.  Has not been exercising regularly -although perhaps not as much as previous        Objective       Objective:     Vitals:    04/04/25 1506 04/04/25 1510   BP: 123/80 123/79   BP Location: Left arm Left arm   Patient Position: Sitting Sitting   BP Cuff Size: Adult Adult   Pulse: 83 85   Weight: 69.9 kg (154 lb)    Height: 1.676 m (5' 6\")         Body mass index is 24.86 kg/m².  Physical Exam  Constitutional:       General: He is not in acute distress.     Appearance: He is not diaphoretic.   HENT:      Head: Normocephalic and atraumatic.   Eyes:      General: No scleral icterus.     Conjunctiva/sclera: Conjunctivae normal.   Pulmonary:      Effort: Pulmonary effort is normal. No respiratory distress.   Musculoskeletal:         General: No tenderness.   Skin:     Coloration: Skin is not pale.   Neurological:      General: No focal deficit present.      Mental Status: He is alert and oriented to person, place, and time.      Cranial Nerves: No cranial nerve deficit.      Coordination: Coordination normal.   Psychiatric:         Mood and Affect: Mood normal.         Behavior: Behavior normal.     -  Lab Results   Component Value Date    CHOLSTRLTOT 199 03/27/2025    CHOLSTRLTOT 173 12/13/2024    LDL " 108 (H) 12/13/2024    HDL 57 03/27/2025    HDL 52 12/13/2024    TRIGLYCERIDE 88 03/27/2025    TRIGLYCERIDE 64 12/13/2024    LDLPART 20.8 03/27/2025    LDLPART 1209 (H) 03/27/2025    SMLLDL 614 03/27/2025    LHDLPART 4.3 03/27/2025    LVLDLPT <1.5 03/27/2025    LDLCHOL 124 03/27/2025    HDLSIZE 8.7 (L) 03/27/2025    VLDLSIZE 47.1 (H) 03/27/2025    HDLPART 37.4 03/27/2025           Data review -     Cardiovascular imaging:    MRA brain and neck January 2024  1.  No acute intracranial abnormality.  2.  Unremarkable orbits.  3.  Bilateral fetal-type PCA with very diminutive posterior circulation, likely anatomic variant. No flow-limiting stenosis, occlusion, or aneurysm in the intracranial and extracranial circulations.  4.  Normal perfusion.     Blood work:    Blood work 2019  ANCA negative  Rheumatoid factor negative  Factor V Leiden negative    Repeat blood work oct 2019 - hgb 17.5    AbPM Oct 2019:  Mean daytime: 119/72 indicative of well-controlled out of office blood pressure  Nocturnal dip: Appears blunted    Blood work September 2020-hemoglobin 17.3, glucose 82, homocystine 10, GFR 96    Blood work [facility] October 2021  White count 4.2, hemoglobin 16.8, platelet count 214,  Heterozygous for MTHFR variant  Santiago 2 mutation negative    Blood work November 2023  Antithrombin III normal  Antiphospholipid antibodies unremarkable  Lupus anticoagulant negative  Total cholesterol 203, HDL 62, triglycerides 61,   Homocystine 13.8  A1c 5.0  Lysozyme 6.3  Protein C normal  Protein S normal  ACE enzyme normal  PRATIMA normal    Blood work feb 2024  MHTFR dna analysis - postive  PTGM - negative  Hgb 17.6  Total c 163, HDL 60, trig 117, LDL 82  Homocysteine 11.6    Blood work march 2024  Hgb - 17.2    Homocyteine 8.3 (june 2024)    Blood work December 2024  Testosterone 801  Homocystine 8.51        Lab Results   Component Value Date/Time    CHOLSTRLTOT 199 03/27/2025 01:11 PM    CHOLSTRLTOT 173 12/13/2024 12:40 PM      "(H) 12/13/2024 12:40 PM    HDL 57 03/27/2025 01:11 PM    HDL 52 12/13/2024 12:40 PM    TRIGLYCERIDE 88 03/27/2025 01:11 PM    TRIGLYCERIDE 64 12/13/2024 12:40 PM     LDL C 124  LDLP 1209  Homocysteine 7.87  No results found for: \"LIPOPROTA\"   No results found for: \"APOB\"   No results found for: \"CRPHIGHSEN\"     Lab Results   Component Value Date/Time    SODIUM 141 03/27/2025 01:12 PM    POTASSIUM 4.6 03/27/2025 01:12 PM    CHLORIDE 103 03/27/2025 01:12 PM    CO2 26 03/27/2025 01:12 PM    GLUCOSE 90 03/27/2025 01:12 PM    BUN 14 03/27/2025 01:12 PM    CREATININE 1.11 03/27/2025 01:12 PM     Lab Results   Component Value Date/Time    ALKPHOSPHAT 78 03/27/2025 01:12 PM    ASTSGOT 26 03/27/2025 01:12 PM    ALTSGPT 22 03/27/2025 01:12 PM    TBILIRUBIN 0.7 03/27/2025 01:12 PM         Lab Results   Component Value Date    WBC 4.2 (L) 03/27/2025    RBC 5.63 03/27/2025    HEMOGLOBIN 17.7 03/27/2025    HEMATOCRIT 50.5 03/27/2025    MCV 89.7 03/27/2025    MCH 31.4 03/27/2025    MCHC 35.0 03/27/2025    MPV 9.3 03/27/2025        Medical Decision Making:  Today's Assessment / Status / Plan:     1. Central retinal vein occlusion with macular edema of right eye  CBC WITHOUT DIFFERENTIAL    Comp Metabolic Panel    LipoFit by NMR    HOMOCYSTEINE    CORTISOL      2. Hyperhomocystinemia  CBC WITHOUT DIFFERENTIAL    Comp Metabolic Panel    LipoFit by NMR    HOMOCYSTEINE    Lipoprotein (a)          ASSESSMENT    - Central retinal vein occlusion, recurrent and unexplained -probably multifactorial -vision relatively stable  - No obvious evidence of connective tissue issues or hypercoaguable state  -MTHFR heterozygous and high homocysteine levels -significantly improved with supplementation  - Mldly elevated hgb -persistent -  without genetic predisposition to PV per heme  - BP at home in office and on ABPM c/w prehypertension or mild stage 1.  Much improved both at home and in office with lifestyle mod  -Mild dyslipidemia, " improved    PLAN:    As I discussed previously on multiple occassions with patient and his mother, aside from ruling out hypercoagulable state there is not much of a role for vascular medicine in the management of central retinal vein occlusion.  I have also been very clear with him that I am not an expert in management of hyperhomocysteinemia. I have told the patient and his mom that he should defer to optho at [place] and his other specialists for further recommendations    -continue home blood pressure monitoring  -Consider antihypertensive therapy, likely ARB, if greater than 130/80 at home, but currently appears below that threshold  -Consider repeat ABPM in future  -Continue additional supplements to lower homocysteine levels - has shown significant improvement, but as we previously discussed the potential for AEs at this dose is unkown  -We had a long discussion about potential for antiplatelet therapy.  There is no clear Level One evidence that would be beneficial, but [place] hematology has also recommended aspirin at least every other day.  He will restart but hold for 10 days prior to each of his injections given his concern for risk of bleeding  -again recommended donating blood every couple of months  -Follow-up regularly with ophthalmology and retinal specialist  -Report any vision changes to ophthalmology immediately  -Follow-up with hematology, rheumatology and other specialist at [place] as scheduled  -recheck homocysteine and cbc prior to next visit  -Recheck NMR lipid profile and lipoprotein a prior to next visit at patient request  -Continue much improved dietary habits and maintenance of weight  -Increase frequency of exercise    We will partner with other providers in the management of established vascular disease and cardiometabolic risk factors.    Studies to Be Obtained: none  Labs to Be Obtained: As above prior to next visit    Follow up in: 3 months    Michael J Bloch, M.D.

## 2025-04-04 NOTE — TELEPHONE ENCOUNTER
Called and left message for patient.  Requesting contact information for patient's PCP.  (Jocelyne Larson does not have a PCP named Dr Rhett Harmon)    Please confirm PCP and contact information, so we can request updated Vascular Referral. And send back Rubi Collier, Med Ass't.      Rubi Collier, Med Ass't  [facility]  Ph. [phone]  Fx. [phone]

## 2025-04-08 ENCOUNTER — TELEPHONE (OUTPATIENT)
Dept: VASCULAR LAB | Facility: MEDICAL CENTER | Age: 35
End: 2025-04-08
Payer: COMMERCIAL

## 2025-04-08 NOTE — TELEPHONE ENCOUNTER
Caller: STERLING WITH [facility]    Topic/issue: Their office was calling regarding a referral that they received regarding this patient to see a Rhett Alcala in their group but they have no provider by that name in their office and has never been with with their group and they were asking for a call back. Please advise      Callback Number: [phone]      Thank you    -Raudel MAN

## 2025-04-09 NOTE — TELEPHONE ENCOUNTER
Caller: Rob Castillo      Topic/issue: Patient was returning our call regarding his Primary care provider fax number for Dr. Rhett Harmon and he provided the providers fax number(fax# [phone] to [facility] medicine)    Callback Number: [phone]    Thank you    -Raudel MAN

## 2025-04-09 NOTE — TELEPHONE ENCOUNTER
Called and left message     Notified patient that Dr Rhett Harmon was not at [facility].    Unable to contact provider to get an updated referral for Vascular Medicine at [facility].    Requested call back from patient to provide ph#/fx# for his PCP.  Please forward information to Rubi Collier, Med Ass't    Rubi Collier, Med Ass't  [facility]  Ph. [phone]  Fx. [phone]

## 2025-04-11 NOTE — TELEPHONE ENCOUNTER
Faxed a request for updated Vascular Referral to fax# [phone] to [facility] internal medicine. Attn Dr Rhett Harmon.    Rubi Collier, Med Ass't  [facility]  Ph. [phone]  Fx. [phone]

## 2025-04-18 NOTE — TELEPHONE ENCOUNTER
Called [facility] internal medicine [phone]    They have received the request for updated referral.  SURESH is out this week, she will be back next week and send it over to our office.    Rubi Collier, Med Ass't  [facility]  Ph. [phone]  Fx. [phone]

## 2025-07-04 ENCOUNTER — APPOINTMENT (OUTPATIENT)
Dept: CARDIOLOGY | Facility: MEDICAL CENTER | Age: 35
End: 2025-07-04
Attending: INTERNAL MEDICINE
Payer: COMMERCIAL

## 2025-07-04 ENCOUNTER — HOSPITAL ENCOUNTER (OUTPATIENT)
Dept: RADIOLOGY | Facility: MEDICAL CENTER | Age: 35
End: 2025-07-04
Attending: INTERNAL MEDICINE
Payer: COMMERCIAL

## 2025-07-04 DIAGNOSIS — I25.10 DISEASE OF CARDIOVASCULAR SYSTEM: ICD-10-CM

## 2025-07-04 DIAGNOSIS — I49.9 CHRONIC CARDIAC ARRHYTHMIA: ICD-10-CM

## 2025-07-04 LAB
LV EJECT FRACT  99904: 53
LV EJECT FRACT MOD 2C 99903: 57.16
LV EJECT FRACT MOD 4C 99902: 50.91
LV EJECT FRACT MOD BP 99901: 53.2

## 2025-07-04 PROCEDURE — 93306 TTE W/DOPPLER COMPLETE: CPT | Mod: 26 | Performed by: INTERNAL MEDICINE

## 2025-07-04 PROCEDURE — 93306 TTE W/DOPPLER COMPLETE: CPT

## 2025-07-04 PROCEDURE — 4410556 CT-CARDIAC SCORING (SELF PAY ONLY)

## 2025-07-04 PROCEDURE — 700117 HCHG RX CONTRAST REV CODE 255: Mod: UD | Performed by: INTERNAL MEDICINE

## 2025-07-04 RX ADMIN — HUMAN ALBUMIN MICROSPHERES AND PERFLUTREN 3 ML: 10; .22 INJECTION, SOLUTION INTRAVENOUS at 09:45

## 2025-07-09 ENCOUNTER — DOCUMENTATION (OUTPATIENT)
Dept: VASCULAR LAB | Facility: MEDICAL CENTER | Age: 35
End: 2025-07-09
Payer: MEDICAID

## 2025-07-09 DIAGNOSIS — I38 VALVULAR HEART DISEASE: Primary | ICD-10-CM

## 2025-07-09 NOTE — PROGRESS NOTES
Echo with poorly visualized aortic valve anatomy, but possible mild-moderate AI.   Will refer to structural heart for further evaluation.    Michael Bloch, MD  [facility]

## 2025-07-14 ENCOUNTER — TELEPHONE (OUTPATIENT)
Dept: VASCULAR LAB | Facility: MEDICAL CENTER | Age: 35
End: 2025-07-14
Payer: MEDICAID

## 2025-07-14 NOTE — TELEPHONE ENCOUNTER
Caller: Rob Castillo    Topic/issue: Patient called quickly on his break - did not have much time to talk. Wanted me to pass along that he can be rescheduled for any morning appointment (the earlier the better - mentioned 8 am if possible). Would prefer [facility], but can do [facility] if necessary. He is at work so unable to talk - if we wanted to select a new appointment for him he can callback tomorrow if it doesn't work.      Callback Number: [phone]    Thank you,  Monica MAN

## 2025-07-14 NOTE — TELEPHONE ENCOUNTER
Called and left message to reschedule appt with Dr Michael Bloch for Vascular Medicine.     We have an available openings:        Tue july 15 at , 940 120, 220, 340 - multiple holes in schedule    Tue july 22 at  (held), 1140, 420    Thur july 24 at  (held)    Friday july 25 1-4 - MAIN (same as previous)    Tue july 29 1 and 420         Please reschedule appropriately, if unable, please contact to Rubi Collier, Med Ass't     Rubi Collier, Med Ass't  [facility]  Ph. [phone]  Fx. [phone]

## 2025-07-15 NOTE — TELEPHONE ENCOUNTER
Called and left message for patient. Rescheduled appt for 07/22 at 940 at [facility].  If patient is unable to make that day/time. Please contact Rubi Collier, Med Ass't to discuss    Rubi Collier, Med Ass't  [facility]  Ph. [phone]  Fx. [phone]

## 2025-07-22 ENCOUNTER — OFFICE VISIT (OUTPATIENT)
Facility: MEDICAL CENTER | Age: 35
End: 2025-07-22
Attending: INTERNAL MEDICINE
Payer: MEDICAID

## 2025-07-22 VITALS
HEART RATE: 96 BPM | SYSTOLIC BLOOD PRESSURE: 110 MMHG | WEIGHT: 156 LBS | BODY MASS INDEX: 25.07 KG/M2 | DIASTOLIC BLOOD PRESSURE: 70 MMHG | HEIGHT: 66 IN

## 2025-07-22 DIAGNOSIS — I10 HYPERTENSION, UNSPECIFIED TYPE: Primary | ICD-10-CM

## 2025-07-22 DIAGNOSIS — R79.83 HYPERHOMOCYSTINEMIA: ICD-10-CM

## 2025-07-22 DIAGNOSIS — D75.1 POLYCYTHEMIA: ICD-10-CM

## 2025-07-22 DIAGNOSIS — I38 VALVULAR HEART DISEASE: ICD-10-CM

## 2025-07-22 PROCEDURE — 3078F DIAST BP <80 MM HG: CPT | Performed by: INTERNAL MEDICINE

## 2025-07-22 PROCEDURE — 3074F SYST BP LT 130 MM HG: CPT | Performed by: INTERNAL MEDICINE

## 2025-07-22 PROCEDURE — 99214 OFFICE O/P EST MOD 30 MIN: CPT | Performed by: INTERNAL MEDICINE

## 2025-07-22 ASSESSMENT — FIBROSIS 4 INDEX: FIB4 SCORE: 0.89

## 2025-07-22 NOTE — PROGRESS NOTES
"  Follow Up VASCULAR VISIT  Subjective:   Rob Castillo is a 35 y.o. male who presents 07/22/25   for vascular followup     Subjective      HPI:  Here for f/u of central retinal vein occlusion  No change in vision  He has been following up regularly with optho at [place]  He saw hematology at [facility] as well  He is taking multiple supplements to lower homocysteine  He has seen rheum at [facility] and in [facility] - no change  He has not yet given blood  BPs at home mostly less than 130/80  No blood pressure medications  No lipid-lowering therapy  He is off steroids or any other interfering substances  Had echo that showed possible AI (?BAV) - referred to structural heart but has not beenseen them yet  Rheum ordered repeat CT scans but those have not yet been obtained    Social history -no smoking.  Vigorously denies any stimulants.  No alcohol.  No illicit drug use.  Eats relatively healthy.  Has not been exercising regularly -although perhaps not as much as previous        Objective       Objective:     Vitals:    07/22/25 1001 07/22/25 1004   BP: 122/85 110/70   BP Location: Left arm Left arm   Patient Position: Sitting Sitting   BP Cuff Size: Adult Adult   Pulse: 77 96   Weight: 70.8 kg (156 lb)    Height: 1.676 m (5' 6\")           Body mass index is 25.18 kg/m².  Physical Exam  Constitutional:       General: He is not in acute distress.     Appearance: He is not diaphoretic.   HENT:      Head: Normocephalic and atraumatic.   Eyes:      General: No scleral icterus.     Conjunctiva/sclera: Conjunctivae normal.   Pulmonary:      Effort: Pulmonary effort is normal. No respiratory distress.   Musculoskeletal:         General: No tenderness.   Skin:     Coloration: Skin is not pale.   Neurological:      General: No focal deficit present.      Mental Status: He is alert and oriented to person, place, and time.      Cranial Nerves: No cranial nerve deficit.      Coordination: Coordination normal.   Psychiatric:  "        Mood and Affect: Mood normal.         Behavior: Behavior normal.     -  Lab Results   Component Value Date    CHOLSTRLTOT 199 03/27/2025    CHOLSTRLTOT 173 12/13/2024     (H) 12/13/2024    HDL 57 03/27/2025    HDL 52 12/13/2024    TRIGLYCERIDE 88 03/27/2025    TRIGLYCERIDE 64 12/13/2024    LDLPART 20.8 03/27/2025    LDLPART 1209 (H) 03/27/2025    SMLLDL 614 03/27/2025    LHDLPART 4.3 03/27/2025    LVLDLPT <1.5 03/27/2025    LDLCHOL 124 03/27/2025    HDLSIZE 8.7 (L) 03/27/2025    VLDLSIZE 47.1 (H) 03/27/2025    HDLPART 37.4 03/27/2025           Data review -     Cardiovascular imaging:    MRA brain and neck January 2024  1.  No acute intracranial abnormality.  2.  Unremarkable orbits.  3.  Bilateral fetal-type PCA with very diminutive posterior circulation, likely anatomic variant. No flow-limiting stenosis, occlusion, or aneurysm in the intracranial and extracranial circulations.  4.  Normal perfusion.     Blood work:    Blood work 2019  ANCA negative  Rheumatoid factor negative  Factor V Leiden negative    Repeat blood work oct 2019 - hgb 17.5    AbPM Oct 2019:  Mean daytime: 119/72 indicative of well-controlled out of office blood pressure  Nocturnal dip: Appears blunted    Blood work September 2020-hemoglobin 17.3, glucose 82, homocystine 10, GFR 96    Blood work [facility] October 2021  White count 4.2, hemoglobin 16.8, platelet count 214,  Heterozygous for MTHFR variant  Santiago 2 mutation negative    Blood work November 2023  Antithrombin III normal  Antiphospholipid antibodies unremarkable  Lupus anticoagulant negative  Total cholesterol 203, HDL 62, triglycerides 61,   Homocystine 13.8  A1c 5.0  Lysozyme 6.3  Protein C normal  Protein S normal  ACE enzyme normal  PRATIMA normal    Blood work feb 2024  MHTFR dna analysis - postive  PTGM - negative  Hgb 17.6  Total c 163, HDL 60, trig 117, LDL 82  Homocysteine 11.6    Blood work march 2024  Hgb - 17.2    Homocyteine 8.3 (june 2024)    Blood work  "December 2024  Testosterone 801  Homocystine 8.51    Blood work March 2025  GFR 90  Hemoglobin 17.7  Sodium 141, potassium 4.6  Homocystine 7.8  Total cholesterol 199, triglycerides 88, HDL 57, LDL-C 124, LDL P 1209    CAC score july 2025  LMA - 0.0  LCX - 0.0  LAD - 0.0  RCA - 0.0  Total Calcium Score: 0.0    Echo july 2025  No prior study is available for comparison.   Aortic valve anatomy not well visualized. Mild-moderate insufficieny.   Recommend BENNY for better evaluation.  Low-normal LVEF 50-55% with normal LV size and thickness  Normal RV size and systolic function  Normal IVC size  No pericardial effusion  The ascending aorta diameter is 3.1 cm        Lab Results   Component Value Date/Time    CHOLSTRLTOT 199 03/27/2025 01:11 PM    CHOLSTRLTOT 173 12/13/2024 12:40 PM     (H) 12/13/2024 12:40 PM    HDL 57 03/27/2025 01:11 PM    HDL 52 12/13/2024 12:40 PM    TRIGLYCERIDE 88 03/27/2025 01:11 PM    TRIGLYCERIDE 64 12/13/2024 12:40 PM     LDL C 124  LDLP 1209  Homocysteine 7.87  No results found for: \"LIPOPROTA\"   No results found for: \"APOB\"   No results found for: \"CRPHIGHSEN\"     Lab Results   Component Value Date/Time    SODIUM 141 03/27/2025 01:12 PM    POTASSIUM 4.6 03/27/2025 01:12 PM    CHLORIDE 103 03/27/2025 01:12 PM    CO2 26 03/27/2025 01:12 PM    GLUCOSE 90 03/27/2025 01:12 PM    BUN 14 03/27/2025 01:12 PM    CREATININE 1.11 03/27/2025 01:12 PM     Lab Results   Component Value Date/Time    ALKPHOSPHAT 78 03/27/2025 01:12 PM    ASTSGOT 26 03/27/2025 01:12 PM    ALTSGPT 22 03/27/2025 01:12 PM    TBILIRUBIN 0.7 03/27/2025 01:12 PM         Lab Results   Component Value Date    WBC 4.2 (L) 03/27/2025    RBC 5.63 03/27/2025    HEMOGLOBIN 17.7 03/27/2025    HEMATOCRIT 50.5 03/27/2025    MCV 89.7 03/27/2025    MCH 31.4 03/27/2025    MCHC 35.0 03/27/2025    MPV 9.3 03/27/2025        Medical Decision Making:  Today's Assessment / Status / Plan:     1. Hypertension, unspecified type  Referral to " establish with PCP      2. Hyperhomocystinemia  Referral to establish with PCP      3. Polycythemia  Referral to establish with PCP      4. Valvular heart disease            ASSESSMENT    - Central retinal vein occlusion, recurrent and unexplained -probably multifactorial -vision relatively stable  - No obvious evidence of connective tissue issues or hypercoaguable state  -MTHFR heterozygous and high homocysteine levels -significantly improved with supplementation  - Mldly elevated hgb -persistent -  without genetic predisposition to PV per heme  - BP elevation -  at home in office and on ABPM c/w prehypertension or mild stage 1.  Much improved both at home and in office with lifestyle mod  -Mild dyslipidemia, improved  -possible AI/BAV - no clear association with RVO    PLAN:    As I discussed previously on multiple occassions with patient and his mother and again today, aside from ruling out hypercoagulable state there is not much of a role for vascular medicine in the management of central retinal vein occlusion.  I have also been very clear with him that I am not an expert in management of hyperhomocysteinemia. I have told the patient and his mom that he should defer to optho at [facility] and his other specialists for further recommendations    -referral to structural heart for further evaluation of aortic valve, including possible BENNY  -continue home blood pressure monitoring  -Consider antihypertensive therapy, likely ARB, if greater than 130/80 at home, but currently appears below that threshold  -Consider repeat ABPM in future  -Continue additional supplements to lower homocysteine levels - has shown significant improvement, but as we previously discussed the potential for AEs at this dose is unkown  -We had a long discussion about potential for antiplatelet therapy.  There is no clear Level One evidence that would be beneficial, but [facility] has also recommended aspirin at least every other day.   He will continue but hold for 10 days prior to each of his injections given his concern for risk of bleeding  -again recommended donating blood approximately every quarter  -Follow-up regularly with ophthalmology and retinal specialist  -Report any vision changes to ophthalmology immediately  -Follow-up with hematology, rheumatology and other specialists both here and at [facility] as scheduled  -recheck homocysteine and cbc prior to next visit  -Recheck NMR lipid profile and lipoprotein a prior to next visit at patient request  -his rheumatologist ordered CTA neck, chest, abdomen - recommended that he schedule per her instructions or discuss further with them  -Continue much improved dietary habits and maintenance of weight  -Increase frequency of exercise    We will partner with other providers in the management of established vascular disease and cardiometabolic risk factors. His current PCP is retiring. I have recommended he establish with [facility] and put in a referral    Studies to Be Obtained: per structural heart and rheum  Labs to Be Obtained: As previously ordered    Follow up in: 4 months    Time: 31 min - - chart review/prep, review of other providers' records, imaging/lab review, face-to-face time for history/examination, ordering, prescribing,  review of results/meds/ treatment plan with patient/family/caregiver, documentation in EMR, care coordination (as needed)      Michael J Bloch, M.D.     Cc  [facility]   [facility]

## 2025-07-31 ENCOUNTER — APPOINTMENT (OUTPATIENT)
Facility: MEDICAL CENTER | Age: 35
End: 2025-07-31
Payer: MEDICAID

## 2025-08-18 DIAGNOSIS — R79.83 HYPERHOMOCYSTINEMIA: ICD-10-CM

## 2025-08-18 DIAGNOSIS — H34.8110 CENTRAL RETINAL VEIN OCCLUSION WITH MACULAR EDEMA OF RIGHT EYE: ICD-10-CM
